# Patient Record
Sex: FEMALE | Race: WHITE | NOT HISPANIC OR LATINO | Employment: OTHER | URBAN - METROPOLITAN AREA
[De-identification: names, ages, dates, MRNs, and addresses within clinical notes are randomized per-mention and may not be internally consistent; named-entity substitution may affect disease eponyms.]

---

## 2017-01-04 ENCOUNTER — OFFICE VISIT (OUTPATIENT)
Dept: NEUROLOGY | Facility: CLINIC | Age: 67
End: 2017-01-04
Payer: MEDICARE

## 2017-01-04 VITALS
SYSTOLIC BLOOD PRESSURE: 104 MMHG | HEIGHT: 62 IN | WEIGHT: 140 LBS | BODY MASS INDEX: 25.76 KG/M2 | HEART RATE: 87 BPM | DIASTOLIC BLOOD PRESSURE: 69 MMHG

## 2017-01-04 DIAGNOSIS — R13.10 DYSPHAGIA, UNSPECIFIED TYPE: Primary | ICD-10-CM

## 2017-01-04 DIAGNOSIS — R11.0 NAUSEA: ICD-10-CM

## 2017-01-04 DIAGNOSIS — R14.0 GENERALIZED BLOATING: ICD-10-CM

## 2017-01-04 DIAGNOSIS — G20.A1 PD (PARKINSON'S DISEASE): ICD-10-CM

## 2017-01-04 DIAGNOSIS — G20.A1 DEMENTIA DUE TO PARKINSON'S DISEASE WITHOUT BEHAVIORAL DISTURBANCE: ICD-10-CM

## 2017-01-04 DIAGNOSIS — F02.80 DEMENTIA DUE TO PARKINSON'S DISEASE WITHOUT BEHAVIORAL DISTURBANCE: ICD-10-CM

## 2017-01-04 PROCEDURE — 99215 OFFICE O/P EST HI 40 MIN: CPT | Mod: S$PBB,,, | Performed by: PSYCHIATRY & NEUROLOGY

## 2017-01-04 PROCEDURE — 99999 PR PBB SHADOW E&M-EST. PATIENT-LVL III: CPT | Mod: PBBFAC,,, | Performed by: PSYCHIATRY & NEUROLOGY

## 2017-01-04 PROCEDURE — 99213 OFFICE O/P EST LOW 20 MIN: CPT | Mod: PBBFAC,PN | Performed by: PSYCHIATRY & NEUROLOGY

## 2017-01-04 NOTE — MR AVS SNAPSHOT
[facility]  [address]  [place] [address]  Phone: [phone]  Fax: [phone]                  Dilma Conti   2017 10:00 AM   Office Visit    Description:  Female : 1950   Provider:  Teena Helton MD   Department:  [facility]           Reason for Visit     Parkinson's Disease           Diagnoses this Visit        Comments    Dysphagia, unspecified type    -  Primary     PD (Parkinson's disease)         Nausea         Generalized bloating         Dementia due to Parkinson's disease without behavioral disturbance                To Do List           Goals (5 Years of Data)     None      Follow-Up and Disposition     Return in about 4 weeks (around 2017) for PD.    Follow-up and Disposition History      [facility]     [facility] On Call Nurse Care Line -  Assistance  Registered nurses in the [facility] provide clinical advisement, health education, appointment booking, and other advisory services.  Call for this free service at [phone].             Medications           Message regarding Medications     Verify the changes and/or additions to your medication regime listed below are the same as discussed with your clinician today.  If any of these changes or additions are incorrect, please notify your healthcare provider.        STOP taking these medications     linaclotide (LINZESS) 145 mcg Cap capsule Take 290 mcg by mouth Daily.           Verify that the below list of medications is an accurate representation of the medications you are currently taking.  If none reported, the list may be blank. If incorrect, please contact your healthcare provider. Carry this list with you in case of emergency.           Current Medications     alprazolam (XANAX) 0.5 MG tablet TAKE 1 TABLET BY MOUTH 3 TIMES A DAY AS NEEDED FOR ANXIETY    bisacodyl (DULCOLAX) 5 mg EC tablet Take 5 mg by mouth daily as needed for Constipation.     "carbidopa-levodopa  mg (SINEMET)  mg per tablet Take 1.5 tablets by mouth 4 (four) times daily.    estradiol (ESTRACE) 2 MG tablet Take 2 mg by mouth once daily.    famotidine (PEPCID) 20 MG tablet TAKE 1 TABLET BY MOUTH EVERY DAY FOR STOMACH    hydrochlorothiazide (MICROZIDE) 12.5 mg capsule Take 1 capsule (12.5 mg total) by mouth once daily.    levothyroxine (SYNTHROID) 50 MCG tablet Take 1 tablet (50 mcg total) by mouth once daily.    midodrine (PROAMATINE) 10 MG tablet Take one in morning and noon.    mirtazapine (REMERON) 15 MG tablet Take 1 tablet (15 mg total) by mouth every evening.    ondansetron (ZOFRAN-ODT) 4 MG TbDL Take 1 tablet (4 mg total) by mouth every 8 (eight) hours as needed (nausea).    polyethylene glycol (GLYCOLAX) 17 gram/dose powder Take 17 g by mouth once daily.    tramadol (ULTRAM) 50 mg tablet Take 1 tablet (50 mg total) by mouth every 6 (six) hours as needed for Pain.    trazodone (DESYREL) 100 MG tablet TAKE 2 TABLETS BY MOUTH EVERY EVENING.    venlafaxine (EFFEXOR-XR) 150 MG Cp24 TAKE ONE CAPSULE BY MOUTH EVERY DAY           Clinical Reference Information           Vital Signs - Last Recorded  Most recent update: 1/4/2017  9:53 AM by María Sanchez MA    BP Pulse Ht Wt BMI    104/69 (BP Location: Left arm, Patient Position: Sitting, BP Method: Automatic) 87 5' 1.5" (1.562 m) 63.5 kg (140 lb) 26.02 kg/m2      Blood Pressure          Most Recent Value    BP  104/69      Allergies as of 1/4/2017     Mirapex [Pramipexole]    Bactrim [Sulfamethoxazole-trimethoprim]    Dilaudid [Hydromorphone]    Gabapentin    Lipitor [Atorvastatin]    Statins-hmg-coa Reductase Inhibitors    Zocor [Simvastatin]    Demerol [Meperidine]    Flu Vaccine 2011 (36 Mos+)(pf)      Immunizations Administered on Date of Encounter - 1/4/2017     None      Orders Placed During Today's Visit      Normal Orders This Visit    Ambulatory referral to Speech Therapy     Future Labs/Procedures Expected by " Expires    Fl Modified Barium Swallow Speech  1/4/2017 1/4/2018    NM Gastric Emptying  1/4/2017 1/4/2018      [facility] Sign-Up     Activating your [facility] account is as easy as 1-2-3!     1) Visit [email or web address], select Sign Up Now, enter this activation code and your date of birth, then select Next.  Activation code not generated  Current Patient Portal Status: Account disabled      2) Create a username and password to use when you visit [facility] in the future and select a security question in case you lose your password and select Next.    3) Enter your e-mail address and click Sign Up!    Additional Information  If you have questions, please e-mail [email or web address] or call [phone] to talk to our [facility] staff. Remember, [facility] is NOT to be used for urgent needs. For medical emergencies, dial 911.

## 2017-05-02 ENCOUNTER — PATIENT MESSAGE (OUTPATIENT)
Dept: NEUROLOGY | Facility: CLINIC | Age: 67
End: 2017-05-02

## 2017-05-04 ENCOUNTER — PATIENT MESSAGE (OUTPATIENT)
Dept: NEUROLOGY | Facility: CLINIC | Age: 67
End: 2017-05-04

## 2017-05-04 DIAGNOSIS — R11.0 NAUSEA: Primary | ICD-10-CM

## 2017-05-05 PROBLEM — M85.851 OSTEOPENIA OF BOTH THIGHS: Status: ACTIVE | Noted: 2017-05-05

## 2017-05-05 PROBLEM — M85.852 OSTEOPENIA OF BOTH THIGHS: Status: ACTIVE | Noted: 2017-05-05

## 2017-05-17 ENCOUNTER — PATIENT MESSAGE (OUTPATIENT)
Dept: NEUROLOGY | Facility: CLINIC | Age: 67
End: 2017-05-17

## 2017-05-31 ENCOUNTER — TELEPHONE (OUTPATIENT)
Dept: NEUROLOGY | Facility: CLINIC | Age: 67
End: 2017-05-31

## 2017-05-31 NOTE — TELEPHONE ENCOUNTER
----- Message from Delmi Busch sent at 5/30/2017  4:29 PM CDT -----  Contact: Patient  Dilma, patient [phone], Calling for a follow up appointment, to review medication and possible additional testing. Please advise. Thanks.   Scheduled booked.

## 2017-06-20 PROBLEM — M54.12 CERVICAL RADICULOPATHY: Status: ACTIVE | Noted: 2017-06-20

## 2017-06-26 RX ORDER — TRAZODONE HYDROCHLORIDE 100 MG/1
TABLET ORAL
Qty: 60 TABLET | Refills: 10 | Status: SHIPPED | OUTPATIENT
Start: 2017-06-26 | End: 2018-05-29 | Stop reason: SDUPTHER

## 2017-07-05 ENCOUNTER — LAB VISIT (OUTPATIENT)
Dept: LAB | Facility: HOSPITAL | Age: 67
End: 2017-07-05
Attending: PSYCHIATRY & NEUROLOGY
Payer: MEDICARE

## 2017-07-05 ENCOUNTER — OFFICE VISIT (OUTPATIENT)
Dept: NEUROLOGY | Facility: CLINIC | Age: 67
End: 2017-07-05
Payer: MEDICARE

## 2017-07-05 VITALS
WEIGHT: 139.88 LBS | HEIGHT: 62 IN | DIASTOLIC BLOOD PRESSURE: 78 MMHG | HEART RATE: 84 BPM | BODY MASS INDEX: 25.74 KG/M2 | SYSTOLIC BLOOD PRESSURE: 124 MMHG

## 2017-07-05 DIAGNOSIS — R11.0 NAUSEA: ICD-10-CM

## 2017-07-05 DIAGNOSIS — E03.9 ACQUIRED HYPOTHYROIDISM: ICD-10-CM

## 2017-07-05 DIAGNOSIS — G90.3 NEUROLOGIC ORTHOSTATIC HYPOTENSION: ICD-10-CM

## 2017-07-05 DIAGNOSIS — F02.80 DEMENTIA DUE TO PARKINSON'S DISEASE WITHOUT BEHAVIORAL DISTURBANCE: ICD-10-CM

## 2017-07-05 DIAGNOSIS — G20.A1 DEMENTIA DUE TO PARKINSON'S DISEASE WITHOUT BEHAVIORAL DISTURBANCE: ICD-10-CM

## 2017-07-05 DIAGNOSIS — F03.93 DEPRESSION DUE TO DEMENTIA: ICD-10-CM

## 2017-07-05 DIAGNOSIS — M79.605 LEG PAIN, BILATERAL: ICD-10-CM

## 2017-07-05 DIAGNOSIS — M79.604 LEG PAIN, BILATERAL: ICD-10-CM

## 2017-07-05 DIAGNOSIS — G20.A1 PD (PARKINSON'S DISEASE): Primary | ICD-10-CM

## 2017-07-05 DIAGNOSIS — R44.3 HALLUCINATIONS: ICD-10-CM

## 2017-07-05 LAB
ALBUMIN SERPL BCP-MCNC: 3.8 G/DL
ALP SERPL-CCNC: 87 U/L
ALT SERPL W/O P-5'-P-CCNC: <5 U/L
AST SERPL-CCNC: 21 U/L
BILIRUB DIRECT SERPL-MCNC: 0.1 MG/DL
BILIRUB SERPL-MCNC: 0.3 MG/DL
CK SERPL-CCNC: 57 U/L
PROT SERPL-MCNC: 7.7 G/DL
RHEUMATOID FACT SERPL-ACNC: <10 IU/ML
TSH SERPL DL<=0.005 MIU/L-ACNC: 1.43 UIU/ML
URATE SERPL-MCNC: 3 MG/DL

## 2017-07-05 PROCEDURE — 80076 HEPATIC FUNCTION PANEL: CPT

## 2017-07-05 PROCEDURE — 86431 RHEUMATOID FACTOR QUANT: CPT

## 2017-07-05 PROCEDURE — 99999 PR PBB SHADOW E&M-EST. PATIENT-LVL III: CPT | Mod: PBBFAC,,, | Performed by: PSYCHIATRY & NEUROLOGY

## 2017-07-05 PROCEDURE — 1159F MED LIST DOCD IN RCRD: CPT | Mod: ,,, | Performed by: PSYCHIATRY & NEUROLOGY

## 2017-07-05 PROCEDURE — 84443 ASSAY THYROID STIM HORMONE: CPT

## 2017-07-05 PROCEDURE — 1125F AMNT PAIN NOTED PAIN PRSNT: CPT | Mod: ,,, | Performed by: PSYCHIATRY & NEUROLOGY

## 2017-07-05 PROCEDURE — 99214 OFFICE O/P EST MOD 30 MIN: CPT | Mod: S$PBB,,, | Performed by: PSYCHIATRY & NEUROLOGY

## 2017-07-05 PROCEDURE — 36415 COLL VENOUS BLD VENIPUNCTURE: CPT | Mod: PO

## 2017-07-05 PROCEDURE — 82550 ASSAY OF CK (CPK): CPT

## 2017-07-05 PROCEDURE — 84550 ASSAY OF BLOOD/URIC ACID: CPT

## 2017-07-05 RX ORDER — GABAPENTIN 100 MG/1
100 CAPSULE ORAL NIGHTLY
Qty: 30 CAPSULE | Refills: 11 | Status: SHIPPED | OUTPATIENT
Start: 2017-07-05 | End: 2017-10-09

## 2017-07-05 RX ORDER — TRAMADOL HYDROCHLORIDE 50 MG/1
50 TABLET ORAL DAILY PRN
COMMUNITY
End: 2017-10-09

## 2017-07-05 RX ORDER — ONDANSETRON 4 MG/1
4 TABLET, FILM COATED ORAL EVERY 8 HOURS PRN
COMMUNITY
End: 2017-10-09

## 2017-07-05 NOTE — PROGRESS NOTES
"     I. Chief Complaints during this visit:  f/u Patient visit for PD  ?    History of present illness:   67 y.o. W returns in f/u for parkinson's disease.  Accompanied by two daughters-in-law, Loly and Orville.  Her main complaint is her feeling of sickness and nausea every afternoon.    Pain in legs continues to be bad.  Says her muscles are tender.  Heels pain and big toe numb.  Memory continues to be poor.  Continues to have headaches.    Getting around without a walker.  This is one symptom DNLs think is pretty good.  Fell backwards in a store.    Still has some visions ( dog) intermittently.  Also hears people talking.    Ex-  in April.  Endorses depression.    Interval history 17:  Continues to feel "sick" all the time with nausea.  Continues to have significant memory problems.    No recent falls.  Seen by Isaebl diaz 2     Interval history 16:  Now on rytary which also gives her nausea.  Can walk with walker.  Only one fall since last seen.  Pain is better, though still can't stay sitting or standing long or her legs hurt her.  Memory poor.  Emotionally labile.    From my note 16:  Accompanied by Loly (DNL that helps most), son and another DNL.  All say she has worsened in mobility in past couple months.    Since stopping the requip and sinemet on Monday:  - her abdominal edema is better  - her abdominal pain is worse  - her lower extremity stiffness and pain are worse      From my note 3/31/16:  ...accompanied by ex-.  I resumed trazodone and requip soon after last visit because she seemed to worsen in gait ability.  It is unclear if this made any difference as she remains "tore up."  Can't walk well without help.  Can't walk backwards at all.  Continue to says that she is worse since colon surgery last fall.  Feels a "pinch" in her abdomen when breathing, but also a persistent pain in abdomen. The persistant abdominal pain is the same as last year prior to colon " "surgery.  Still significant nausea.  Recent u/s of pelvis and ultrasounds were negative.  Extremely short-term memory loss.  Continues to have the headaches, but only upon awakening.    From my note 2/12/16:  Feels like "something is not right."  Complains of pounding headaches for about 2-3 weeks.  Occur in afternoons every day.  Sits down or lays down.  No scotomas.  Dyskinesias have recurred as well.  Significant vivid dreaming.  Poor, broken sleep.  Also "seems like some body there" outside window.  Will suddenly find herself talking out loud.  Unable to get out of bed in the morning without help.  But then can move around.  Still has the significant depression.  Crying spells.  Short term memory loss.  Hypersensitive to touch, "bones just ache."  Also complains of swelling in her abdomen and legs.  She is concerned about this.    From my note 11/6/15:  Since last seen, she had colon surgery for tortuous colon and adhesions.  She became "mean" with oxycodone, but otherwise, hospitalization was "ok."  Since then, now having regular bowel movements.  Her main concern is her memory.  Ex- endorses.    Left leg has bolts of lightening and not moving if she tries to walk.  No effect from levodopa on this.  Saw dr. Estrella yesterday and getting MRI.  Mood fluctuates, but not as poor as was earlier this summer.  Nausea is still chronic, but appetite much better.  Stomach pain better, but not resolved (takes a tramadol when bad).  No hallucinating in a while.  Mild, intermittent dizziness.    From my note 7/10/15:  At last visit, I reduced the levodopa and added requip in hopes her stomach pain would improve.  Her gait has worsened, but her stomach pain and nausea have not resolved.  She is not sure if she hallucinated, or not the other day.    From my note 6/19/15:  Nausea has picked up, again to point of dehydration.  Still constipated, no BM in a week.  Swaying more, but also having more tremors, very " "irritable.    From my note 3/20/15:  Nausea better since starting prilosec.  Less depressed.  Improved dizzy/ presyncopal spells as well.  Unsteady on feet, must use walker.  Many close-calls.  Cannot get out of a chair, so she bought a lift-chair.  Not sleeping well at night (some nights).    From my phone message 11/11/14:  No phone number taken for  nurse, so I called patient.  No longer on requip (not sure when that was stopped, but reasonable).  Double midodrine to 5mg morning and noon (was only taking 2.5mg) for a week, then up to 10mg bid.  If not doing better with BP and symptoms within the next two weeks, I may have to lower her sinemet (and work her in for an appointment).    From my note 12/30/13:  ..she seems to have spiraled down into depression, again, per daughter.  She says she is achey "all over" and cannot get comfortable at night.  She dozes during the day, but daughter tells me outside of room that she "just won't get out of bed; as if she has given up."  She also mentions that she has epigastric pain every morning.  She has spoken to PCP who recommended GI study, but she wanted to wait longer.  She has new headaches and a "spot" in left eye.  She had migraines in remote past.      II. Review of Systems -as in HPI   III.   Past Medical History    Diagnosis  Date      Depression       Lumbar spondylosis       Parkinsonism  2012      left leg apraxia and fine tremors      Family History    Problem  Relation  Age of Onset      Alzheimer's disease  Mother       Parkinsonism  Mother       Stroke  Father       Hyperlipidemia  Mother       Hypertension  Father       History      Social History      Marital Status:        Spouse Name:  N/A      Number of Children:  N/A      Years of Education:  N/A      Occupational History      Retired       Used to own convenience store      Social History Main Topics      Smoking status:  Current Everyday Smoker -- 1.0 packs/day for 12 years      " "Types:  Cigarettes      Smokeless tobacco:  None      Alcohol Use:  No      Drug Use:  No      Sexually Active:  Yes -- Male partner(s)      Other Topics  Concern      None      Social History Narrative      None    ?      Current Neuro/Psych medication dosing and times:   Midodrine 10mg morning/ noon  Sinemet 25/100 1.5 qid    effexor XR 150mg qd, 75mg qhs   trazodone 200 qhs?  Tramadol for pain  zofran 4 for nausea  hctz 12.5 qd (Not sure she is taking)  ????  Prior disease-specific medications tried and effect/outcomes: mirapex cause headaches, edema and hallucinations; florinef causes nausea; stalevo caused hallucinations; amantadine caused psychosis; rivastigmine; gabapentin (can't recall effects)       Review of patient's allergies indicates:   Allergen Reactions    Mirapex [pramipexole]      Edema, psychosis    Bactrim [sulfamethoxazole-trimethoprim] Nausea And Vomiting    Dilaudid [hydromorphone]     Gabapentin      Pt don't remember    Lipitor [atorvastatin]     Statins-hmg-coa reductase inhibitors      Patient feels like she is having a heart attack.    Zocor [simvastatin]     Demerol [meperidine]      Pt don't remember    Flu vaccine 2011 (36 mos+)(pf)      Actually was before 2011. She cannot recall reaction, but required hospitalization       IV.  Physical Exam (Includes Part III of Unified Parkinsons Disease Rating Scale 2008)  Patient taking PD meds? Yes.    If yes, what medication and hour/minutes since last dose: ?3 days, sinemet?    Vitals:    07/05/17 1035   BP: 124/78   BP Location: Left arm   Patient Position: Sitting   BP Method: Automatic   Pulse: 84   Weight: 63.5 kg (139 lb 14.1 oz)   Height: 5' 1.5" (1.562 m)     General appearance: Well nourished, well developed, no acute distress    Awake, alert and oriented x 3  Mild malaise  Mild dyskinesias, trunk  Left leg apraxia, severe          V.  Summarized pertinant Laboratory/ Radiological Data: ??no new    From my note " "11/6/15:  [place] Cognitive Assessment:   13/30    Lab Results   Component Value Date    [id number] 621 08/31/2016       [facility]   Component Date Value Ref Range Status    Antigliadin Abs, IgA 01/22/2015 6  <20 Units Final    Antigliadin Ab IgG 01/22/2015 3  <20 Units Final    TTG IgA 01/22/2015 7  <20 UNITS Final    TTG IgG 01/22/2015 3  <20 UNITS Final    Arsenic 01/22/2015 Not Detected  0 - 12 ng/mL Final    Lead 01/22/2015 1  0 - 9 mcg/dL Final    Cadmium 01/22/2015 0.3  0.0 - 4.9 ng/mL Final    Mercury 01/22/2015 <1  0 - 9 ng/mL Final    Venous/Capillary 01/22/2015 BILL   Final       From my note 5/27/13:  Jc Scan + for reduced dopamine right bg    Neuropsych testing 2013:  Parkinson's Dementia, mild to moderate Depressive disorder        VI. Medical Decision Making    Problem List Items Addressed This Visit        1 - High    PD (Parkinson's disease) - Primary    Overview     Left leg apraxia, tremor.  Levodopa-responsive         Current Assessment & Plan     Currently, she is doing well in this regard.  Lower dopamine equivalents (in past) caused worsening gait and mobility.  She is ambulating without a walker, which is a great improvement from 2 years ago.    Her parkinsonism is still obviously an atypical type, but more specific diagnosis continues to be elusive.     -> no change in sinemet         Leg pain, bilateral    Overview     "ache" and high sensitivity to touch in all limbs, but worse in legs.           Current Assessment & Plan     This ache is not levodopa-responsive.  Continues, though today I understood the sensitivity to touch better than I have appreciated in past.   -> labs for RF, uric acid, ck   -> trial of gabapentin (she says she tried in past but it made her "nauseous")         Relevant Medications    gabapentin (NEURONTIN) 100 MG capsule    Other Relevant Orders    Uric acid    Hepatic function panel    CK    RHEUMATOID FACTOR       2     Dementia due " to Parkinson's disease without behavioral disturbance    Overview     11/2015 [place] Cognitive Assessment:  13/30 2013 neuropsych:  Mild-moderate PD dementia         Current Assessment & Plan     Casually, she does not seem to be worse than 2 years ago and functionally, much better than a 13/30 would suggest.   -> unable to tolerate memory meds            3     Neurologic orthostatic hypotension    Current Assessment & Plan     stable            4     Depression due to dementia    Current Assessment & Plan     Describes being worse in recent time due to 's death.   -> recent increase in effexor            5     Nausea    Overview     Chronic, persistent, starts around 1-2pm daily.         Current Assessment & Plan     Continued, persistent nausea (though today family made it sound like symptoms only start in afternoons).  She has been through exhaustive GI evaluations with no culprit found.  Her nausea started before levodopa, but we also tried to reduce this a couple years ago without success (gait worsened, nausea remained the same).   -> I suggested avoiding lactose for a week, but she said this was impossible, so we settled on next best test of taking lactaid with every meal.              6     Hallucinations    Overview     Sees her dead dog, hears people.  Markedly worse when on requip or mirapex.         Current Assessment & Plan     Stable, benign hallucinations.           Other Visit Diagnoses    None.           Return in about 3 months (around 10/5/2017) for PD.

## 2017-07-05 NOTE — ASSESSMENT & PLAN NOTE
Casually, she does not seem to be worse than 2 years ago and functionally, much better than a 13/30 would suggest.   -> unable to tolerate memory meds

## 2017-07-05 NOTE — ASSESSMENT & PLAN NOTE
"This ache is not levodopa-responsive.  Continues, though today I understood the sensitivity to touch better than I have appreciated in past.   -> labs for RF, uric acid, ck   -> trial of gabapentin (she says she tried in past but it made her "nauseous")  "

## 2017-07-05 NOTE — ASSESSMENT & PLAN NOTE
Currently, she is doing well in this regard.  Lower dopamine equivalents (in past) caused worsening gait and mobility.  She is ambulating without a walker, which is a great improvement from 2 years ago.    Her parkinsonism is still obviously an atypical type, but more specific diagnosis continues to be elusive.     -> no change in sinemet

## 2017-07-05 NOTE — PATIENT INSTRUCTIONS
Lactaid:   some at the store and take with every meal (including ensure) for 2-3 days.  This is to test if LACTULOSE is actually causing some of your nausea.      Gabapentin:  Take one at night.  This is to reduce the leg pains.

## 2017-07-05 NOTE — ASSESSMENT & PLAN NOTE
Continued, persistent nausea (though today family made it sound like symptoms only start in afternoons).  She has been through exhaustive GI evaluations with no culprit found.  Her nausea started before levodopa, but we also tried to reduce this a couple years ago without success (gait worsened, nausea remained the same).   -> I suggested avoiding lactose for a week, but she said this was impossible, so we settled on next best test of taking lactaid with every meal.

## 2017-07-10 DIAGNOSIS — F32.A DEPRESSION: ICD-10-CM

## 2017-07-10 RX ORDER — VENLAFAXINE HYDROCHLORIDE 150 MG/1
CAPSULE, EXTENDED RELEASE ORAL
Qty: 90 CAPSULE | Refills: 3 | Status: SHIPPED | OUTPATIENT
Start: 2017-07-10 | End: 2018-07-02 | Stop reason: SDUPTHER

## 2017-07-11 DIAGNOSIS — G20.A1 PD (PARKINSON'S DISEASE): ICD-10-CM

## 2017-07-11 RX ORDER — CARBIDOPA AND LEVODOPA 25; 100 MG/1; MG/1
1.5 TABLET ORAL 4 TIMES DAILY
Qty: 270 TABLET | Refills: 7 | Status: SHIPPED | OUTPATIENT
Start: 2017-07-11 | End: 2018-07-19 | Stop reason: SDUPTHER

## 2017-08-21 ENCOUNTER — PATIENT MESSAGE (OUTPATIENT)
Dept: NEUROLOGY | Facility: CLINIC | Age: 67
End: 2017-08-21

## 2017-08-24 ENCOUNTER — PATIENT MESSAGE (OUTPATIENT)
Dept: NEUROLOGY | Facility: CLINIC | Age: 67
End: 2017-08-24

## 2017-08-25 ENCOUNTER — PATIENT MESSAGE (OUTPATIENT)
Dept: NEUROLOGY | Facility: CLINIC | Age: 67
End: 2017-08-25

## 2017-10-09 ENCOUNTER — OFFICE VISIT (OUTPATIENT)
Dept: NEUROLOGY | Facility: CLINIC | Age: 67
End: 2017-10-09
Payer: MEDICARE

## 2017-10-09 VITALS
HEIGHT: 62 IN | HEART RATE: 88 BPM | SYSTOLIC BLOOD PRESSURE: 104 MMHG | WEIGHT: 140.88 LBS | RESPIRATION RATE: 18 BRPM | BODY MASS INDEX: 25.92 KG/M2 | DIASTOLIC BLOOD PRESSURE: 67 MMHG

## 2017-10-09 DIAGNOSIS — R11.0 NAUSEA: ICD-10-CM

## 2017-10-09 DIAGNOSIS — G20.A1 PD (PARKINSON'S DISEASE): Primary | ICD-10-CM

## 2017-10-09 DIAGNOSIS — G20.A1 DEMENTIA DUE TO PARKINSON'S DISEASE WITHOUT BEHAVIORAL DISTURBANCE: ICD-10-CM

## 2017-10-09 DIAGNOSIS — F02.80 DEMENTIA DUE TO PARKINSON'S DISEASE WITHOUT BEHAVIORAL DISTURBANCE: ICD-10-CM

## 2017-10-09 DIAGNOSIS — R44.3 HALLUCINATIONS: ICD-10-CM

## 2017-10-09 DIAGNOSIS — R60.9 EDEMA, UNSPECIFIED TYPE: ICD-10-CM

## 2017-10-09 PROCEDURE — 99214 OFFICE O/P EST MOD 30 MIN: CPT | Mod: S$PBB,,, | Performed by: NURSE PRACTITIONER

## 2017-10-09 PROCEDURE — 99999 PR PBB SHADOW E&M-EST. PATIENT-LVL III: CPT | Mod: PBBFAC,,, | Performed by: NURSE PRACTITIONER

## 2017-10-09 PROCEDURE — 99213 OFFICE O/P EST LOW 20 MIN: CPT | Mod: PBBFAC,PN | Performed by: NURSE PRACTITIONER

## 2017-10-09 NOTE — PATIENT INSTRUCTIONS
Try taking carbidopa / levodopa 25/100- 1 tablet 4 times daily. Okay to use an extra half tablet if needed for tremor, stiffness, and slowness.     Let's see if this helps nausea and hallucinations.    Consider seeing GI doctor if nausea persists.

## 2017-10-09 NOTE — PROGRESS NOTES
Name: Dilma Conti  MRN: [id number]   [facility]: [id number]      Date: 10-9-17      Referring physician:  No referring provider defined for this encounter.    Subjective:      Chief Complaint: PD    History of Present Illness (HPI):    Dilma Conti is a 67 y.o.  female who presents today for a follow-up evaluation of Parkinson's Disease and is accompanied by patient and two daughters-in-law.     Last seen by Dr. Helton 7-5-17. She rec having her take Lactaid to see if this helped nausea. It did not. Email correspondence with Dr. Helton about this.   She stopped the gabapentin as caused hallucinations.     Feet and legs swell and hurt. Numb. Can't stand anyone to touch her legs. This has been this way since diagnosed with PD.   Stays nauseated. Has not seen GI recently. She describes nausea as soon as she awakes. Her last dose of cd/ld at 8:30 PM. Gets up around 7AM.   Hard time communicating.  In shower, she will forget what she is in there for and more confused lately. Says she cannot remember anything.  Her granddaughter lives with her.   She does not drive about 5-6 years.       Review of Systems   Cardiovascular: Positive for leg swelling.   Gastrointestinal: Positive for nausea.   Musculoskeletal:        Pain to touch legs   Neurological: Positive for tremors.   Psychiatric/Behavioral: Positive for hallucinations.               Past Medical History: The patient  has a past medical history of Anxiety; Back pain; Back pain; Dementia in conditions classified elsewhere without behavioral disturbance; Dementia with Lewy bodies; Depression; GERD (gastroesophageal reflux disease); Hyperlipidemia; Lumbar herniated disc; Lumbar radiculopathy; Lumbar radiculopathy; Lumbar spondylosis; Lumbosacral disc disease; Memory loss; Memory loss; PD (Parkinson's disease) (2012); Renal insufficiency; Sciatica of left side; and Tremor.    Social History: The patient  reports that she quit smoking about 3 years ago. Her smoking use  included Cigarettes. She has a 12.00 pack-year smoking history. She has never used smokeless tobacco. She reports that she does not drink alcohol or use drugs.    Family History: Their family history includes Alzheimer's disease in her mother; Hyperlipidemia in her mother; Hypertension in her father; Parkinsonism in her mother; Stroke in her father.    Allergies: Mirapex [pramipexole]; Bactrim [sulfamethoxazole-trimethoprim]; Dilaudid [hydromorphone]; Gabapentin; Lipitor [atorvastatin]; Statins-hmg-coa reductase inhibitors; Zocor [simvastatin]; Demerol [meperidine]; and Flu vaccine 2011 (36 mos+)(pf)     Meds:   Current Outpatient Prescriptions on File Prior to Visit   Medication Sig Dispense Refill    alprazolam (XANAX) 0.5 MG tablet TAKE 1 TABLET BY MOUTH 3 TIMES A DAY AS NEEDED FOR ANXIETY 90 tablet 5    carbidopa-levodopa  mg (SINEMET)  mg per tablet TAKE 1.5 TABLETS BY MOUTH 4 (FOUR) TIMES DAILY. 270 tablet 7    famotidine (PEPCID) 20 MG tablet TAKE 1 TABLET BY MOUTH EVERY DAY FOR STOMACH 30 tablet 11    levothyroxine (SYNTHROID) 50 MCG tablet TAKE 1 TABLET (50 MCG TOTAL) BY MOUTH ONCE DAILY. 30 tablet 11    trazodone (DESYREL) 100 MG tablet TAKE 2 TABLETS BY MOUTH EVERY EVENING. 60 tablet 10    venlafaxine (EFFEXOR-XR) 150 MG Cp24 TAKE ONE CAPSULE BY MOUTH EVERY DAY 90 capsule 3    venlafaxine (EFFEXOR-XR) 75 MG 24 hr capsule Take 1 capsule (75 mg total) by mouth once daily. 30 capsule 11    hydrochlorothiazide (MICROZIDE) 12.5 mg capsule Take 1 capsule (12.5 mg total) by mouth once daily. 30 capsule 11    [DISCONTINUED] gabapentin (NEURONTIN) 100 MG capsule Take 1 capsule (100 mg total) by mouth every evening. 30 capsule 11    [DISCONTINUED] nystatin (MYCOSTATIN) ointment Apply topically 2 (two) times daily. To irritated areas 30 g 1    [DISCONTINUED] ondansetron (ZOFRAN) 4 MG tablet Take 4 mg by mouth every 8 (eight) hours as needed for Nausea.      [DISCONTINUED] tramadol (ULTRAM) 50  "mg tablet Take 50 mg by mouth daily as needed for Pain.       No current facility-administered medications on file prior to visit.        Objective:     Physical Exam:    Vitals:    10/09/17 0943   BP: 104/67   BP Location: Right arm   Patient Position: Sitting   BP Method: Medium (Automatic)   Pulse: 88   Resp: 18   Weight: 63.9 kg (140 lb 14 oz)   Height: 5' 1.5" (1.562 m)     Body mass index is 26.19 kg/m².    Constitutional  Well-developed, well-nourished, appears stated age   Cardiovascular  Radial pulses 2+ and symmetric, subtle BLE edema, no pitting or weeping, mainly noted at ankles.     ..  * Specialized movement exam  Last dose 3 hours ago Mild hypophonic speech.    Mild facial masking.   Mild cogwheel rigidity.  Guards eval of BLE due to pain on touching skin.    Mild bradykinesia RH and near moderate LH. Moderate R foot and near severe L foot.    No tremor with rest or posture.   No other dystonia, chorea, athetosis, myoclonus, or tics.     Pushes self to stand.   No motor impersistence.  Bit broad based gait.    Shortened stride length.   Reduced arm swing.     Pull test deferred.           Laboratory Results:  No visits with results within 3 Month(s) from this visit.   Latest known visit with results is:   Lab Visit on 07/05/2017   Component Date Value Ref Range Status    TSH 07/05/2017 1.425  0.400 - 4.000 uIU/mL Final    Uric Acid 07/05/2017 3.0  2.4 - 5.7 mg/dL Final    Total Protein 07/05/2017 7.7  6.0 - 8.4 g/dL Final    Albumin 07/05/2017 3.8  3.5 - 5.2 g/dL Final    Total Bilirubin 07/05/2017 0.3  0.1 - 1.0 mg/dL Final    Bilirubin, Direct 07/05/2017 0.1  0.1 - 0.3 mg/dL Final    AST 07/05/2017 21  10 - 40 U/L Final    ALT 07/05/2017 <5* 10 - 44 U/L Final    Alkaline Phosphatase 07/05/2017 87  55 - 135 U/L Final    CPK 07/05/2017 57  20 - 180 U/L Final    Rheumatoid Factor 07/05/2017 <10.0  0.0 - 15.0 IU/mL Final         Assessment and Plan     PD (Parkinson's disease)    Dementia due " to Parkinson'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 Okay to use an extra half tablet if needed for tremor, stiffness, and slowness. See if this helps reduce nausea and hallucinations.  Could try Sinemet CR qhs (as describes some discomfort and shaking at night at times) and then try during day if tolerates. May help nausea?    Consider seeing GI doctor if nausea persists.     They ask about the leg pain to touch. I do not think this is PD related. She asks if this could be fibromyalgia. I suspect this is possible but certainly not within my realm of comfort or expertise.     Follow up with Dr. Helton 3-4 mos in [place].         I spent 30 minutes face-to-face with the patient and family with >50% of the time spent with counseling and education regarding:  - results of data, diagnosis, and recommendations stated above  - the prognosis of PD  - risks and benefits of cd/ld  - importance of diet and exercise    Isabel Auguste, DELROY, NP-C  Division of Movement and Memory Disorders  [facility]  [phone]

## 2017-11-16 PROBLEM — M79.10 MUSCLE PAIN: Status: ACTIVE | Noted: 2017-11-16

## 2017-12-20 ENCOUNTER — PATIENT MESSAGE (OUTPATIENT)
Dept: NEUROLOGY | Facility: CLINIC | Age: 67
End: 2017-12-20

## 2017-12-21 ENCOUNTER — PATIENT MESSAGE (OUTPATIENT)
Dept: NEUROLOGY | Facility: CLINIC | Age: 67
End: 2017-12-21

## 2017-12-28 ENCOUNTER — INITIAL CONSULT (OUTPATIENT)
Dept: RHEUMATOLOGY | Facility: CLINIC | Age: 67
End: 2017-12-28
Payer: MEDICARE

## 2017-12-28 ENCOUNTER — HOSPITAL ENCOUNTER (OUTPATIENT)
Dept: RADIOLOGY | Facility: HOSPITAL | Age: 67
Discharge: HOME | End: 2017-12-28
Attending: INTERNAL MEDICINE
Payer: MEDICARE

## 2017-12-28 VITALS
HEART RATE: 76 BPM | BODY MASS INDEX: 26.46 KG/M2 | DIASTOLIC BLOOD PRESSURE: 82 MMHG | HEIGHT: 61 IN | WEIGHT: 140.13 LBS | SYSTOLIC BLOOD PRESSURE: 144 MMHG

## 2017-12-28 DIAGNOSIS — R74.8 ELEVATED CPK: ICD-10-CM

## 2017-12-28 DIAGNOSIS — M25.552 PAIN OF LEFT HIP JOINT: ICD-10-CM

## 2017-12-28 DIAGNOSIS — M79.10 MYALGIA: ICD-10-CM

## 2017-12-28 DIAGNOSIS — G20.A1 PD (PARKINSON'S DISEASE): ICD-10-CM

## 2017-12-28 DIAGNOSIS — M51.36 DDD (DEGENERATIVE DISC DISEASE), LUMBAR: ICD-10-CM

## 2017-12-28 DIAGNOSIS — M25.552 PAIN OF LEFT HIP JOINT: Primary | ICD-10-CM

## 2017-12-28 PROCEDURE — 99999 PR PBB SHADOW E&M-EST. PATIENT-LVL III: CPT | Mod: PBBFAC,,, | Performed by: INTERNAL MEDICINE

## 2017-12-28 PROCEDURE — 99205 OFFICE O/P NEW HI 60 MIN: CPT | Mod: S$PBB,,, | Performed by: INTERNAL MEDICINE

## 2017-12-28 PROCEDURE — 73502 X-RAY EXAM HIP UNI 2-3 VIEWS: CPT | Mod: 26,LT,, | Performed by: RADIOLOGY

## 2017-12-28 PROCEDURE — 99213 OFFICE O/P EST LOW 20 MIN: CPT | Mod: PBBFAC,25,PO | Performed by: INTERNAL MEDICINE

## 2017-12-28 PROCEDURE — 73502 X-RAY EXAM HIP UNI 2-3 VIEWS: CPT | Mod: TC,PO,LT

## 2017-12-28 RX ORDER — TRAMADOL HYDROCHLORIDE 50 MG/1
50 TABLET ORAL EVERY 6 HOURS PRN
Qty: 40 TABLET | Refills: 0 | Status: ON HOLD | OUTPATIENT
Start: 2017-12-28 | End: 2018-12-17

## 2017-12-28 ASSESSMENT — ROUTINE ASSESSMENT OF PATIENT INDEX DATA (RAPID3)
PSYCHOLOGICAL DISTRESS SCORE: 5.5
PATIENT GLOBAL ASSESSMENT SCORE: 10
MDHAQ FUNCTION SCORE: 1.8
PAIN SCORE: 10
TOTAL RAPID3 SCORE: 8.66

## 2017-12-28 NOTE — PROGRESS NOTES
Subjective:          Chief Complaint: Dilma Conti is a 67 y.o. female who had concerns including Disease Management.    HPI:    Patient is a 67-year-old female with a recent elevation in her CPKs 7 prior from July 2017 was normal.  Should a negative rheumatoid factor at that time.  Aldolase on November 16 was normal but the myoglobin was slightly elevated as well.  Her sedimentation rate was elevated 1.  Has been referred by Dr. Woodard to Dr. Reyes for muscle biopsy.    Have an MRI of her lumbar spine showed spondylosis at the L3-4 disc space is rather stable compared to prior.  She had degenerative facet changes L3-4, L4-5 and L5-S1.  No spinal stenosis noted. She is noting pain in her left leg sore to touch. Feels weakness. Left leg swelling. She is having twitching in the left quad region. Ache in the shin as well. She notes problems with left leg for very long time at least 1 year. The swelling is improved with Lasix. She is having some pain in buttocks but cannot pinpoint any back pain. She is having some upper extremity weakness.   Trialed on gabapentin which did exacerbate hallucinations. Tylenol #3 with some hallucinations.   Tylenol: haven't tried.           Record review appears patient was complaining of some leg aches and pains with tenderness in her muscles since at least July 2017.  She does have a history of Parkinson's disease.   She's describing significant pain that she can even tolerate people touching her legs on this seems to been present since she was diagnosed with Parkinson's.  Her November 16, 2017 visit with Dr. Woodard she was noting the left leg was painful ulcerating 8 out of 10 more nocturnal in the morning also on the anterior quads some left shin on his having tingling be staying sensations in the feet to be worsening over time.  Hoping she would tolerate an MRI of her thighs very well as this would require her lying flat for approximately 45 minutes to a little over an  hour.    Component      Latest Ref Rng & Units 11/16/2017 7/5/2017   CPK      55 - 170 U/L 197 (H) 57   Rheumatoid Factor      0.0 - 15.0 IU/mL  <10.0   Sed Rate      0 - 29 mm/Hr 31 (H)    Aldolase      1.5 - 8.1 U/L 4.4    Myoglobin      25 - 58 ng/mL 63 (H)      REVIEW OF SYSTEMS:    Review of Systems   Constitutional: Positive for malaise/fatigue. Negative for fever and weight loss.   HENT: Negative for sore throat.    Eyes: Negative for double vision, photophobia and redness.   Respiratory: Negative for cough, shortness of breath and wheezing.    Cardiovascular: Negative for chest pain, palpitations and orthopnea.   Gastrointestinal: Negative for abdominal pain, constipation and diarrhea.   Genitourinary: Negative for dysuria, hematuria and urgency.   Musculoskeletal: Positive for joint pain and myalgias. Negative for back pain.   Skin: Negative for rash.   Neurological: Positive for tingling. Negative for dizziness, focal weakness and headaches.   Endo/Heme/Allergies: Does not bruise/bleed easily.   Psychiatric/Behavioral: Positive for memory loss. Negative for depression, hallucinations and suicidal ideas.               Objective:            Past Medical History:   Diagnosis Date    Anxiety     Back pain     Back pain     Dementia in conditions classified elsewhere without behavioral disturbance     Dementia with Lewy bodies     Depression     GERD (gastroesophageal reflux disease)     Hyperlipidemia     Lumbar herniated disc     Lumbar radiculopathy     Lumbar radiculopathy     Lumbar spondylosis     Lumbosacral disc disease     Memory loss     Memory loss     PD (Parkinson's disease) 2012    Left leg apraxia, tremor.  Levodopa-responsive     Renal insufficiency     Sciatica of left side     Tremor      Family History   Problem Relation Age of Onset    Alzheimer's disease Mother     Parkinsonism Mother     Hyperlipidemia Mother     Stroke Father     Hypertension Father      Social  History   Substance Use Topics    Smoking status: Former Smoker     Packs/day: 1.00     Years: 12.00     Types: Cigarettes     Quit date: 1/1/2014    Smokeless tobacco: Never Used    Alcohol use No         Current Outpatient Prescriptions on File Prior to Visit   Medication Sig Dispense Refill    alprazolam (XANAX) 0.5 MG tablet TAKE 1 TABLET BY MOUTH 3 TIMES A DAY AS NEEDED FOR ANXIETY 90 tablet 5    carbidopa-levodopa  mg (SINEMET)  mg per tablet TAKE 1.5 TABLETS BY MOUTH 4 (FOUR) TIMES DAILY. 270 tablet 7    famotidine (PEPCID) 20 MG tablet TAKE 1 TABLET BY MOUTH EVERY DAY FOR STOMACH 30 tablet 11    furosemide (LASIX) 20 MG tablet Take 1 tablet (20 mg total) by mouth daily as needed (edema). 30 tablet 11    levothyroxine (SYNTHROID) 50 MCG tablet TAKE 1 TABLET (50 MCG TOTAL) BY MOUTH ONCE DAILY. 30 tablet 11    traMADol (ULTRAM) 50 mg tablet Take 50 mg by mouth every 6 (six) hours as needed for Pain.      trazodone (DESYREL) 100 MG tablet TAKE 2 TABLETS BY MOUTH EVERY EVENING. 60 tablet 10    venlafaxine (EFFEXOR-XR) 150 MG Cp24 TAKE ONE CAPSULE BY MOUTH EVERY DAY 90 capsule 3    venlafaxine (EFFEXOR-XR) 75 MG 24 hr capsule Take 1 capsule (75 mg total) by mouth once daily. (Patient taking differently: Take 75 mg by mouth every evening. ) 30 capsule 11    [DISCONTINUED] hydrochlorothiazide (MICROZIDE) 12.5 mg capsule Take 1 capsule (12.5 mg total) by mouth once daily. 30 capsule 11     No current facility-administered medications on file prior to visit.        Vitals:    12/28/17 1210   BP: (!) 144/82   Pulse: 76       Physical Exam:    Physical Exam   Constitutional: She is oriented to person, place, and time. She appears well-developed and well-nourished.   HENT:   Head: Normocephalic and atraumatic.   Mouth/Throat: Oropharynx is clear and moist.   Eyes: EOM are normal. Pupils are equal, round, and reactive to light.   Neck: Normal range of motion.   Cardiovascular: Normal rate, regular  rhythm and normal heart sounds.    Pulmonary/Chest: Effort normal and breath sounds normal.   Musculoskeletal:        Right shoulder: She exhibits normal range of motion, no tenderness and no swelling.        Left shoulder: She exhibits normal range of motion, no tenderness and no swelling.        Right elbow: She exhibits normal range of motion and no swelling. No tenderness found.        Left elbow: She exhibits normal range of motion and no swelling. No tenderness found.        Right wrist: She exhibits normal range of motion, no tenderness and no swelling.        Left wrist: She exhibits normal range of motion, no tenderness and no swelling.        Left hip: She exhibits decreased range of motion, decreased strength and tenderness.        Right knee: She exhibits normal range of motion and no swelling. No tenderness found.        Left knee: She exhibits normal range of motion and no swelling. No tenderness found.        Right hand: She exhibits normal range of motion, no tenderness and no swelling.        Left hand: She exhibits normal range of motion, no tenderness and no swelling.        Right foot: There is normal range of motion, no tenderness and no swelling.        Left foot: There is normal range of motion, no tenderness and no swelling.   Patient is difficult to assess strength I actually feel that she is not weak on the left lower extremity with any greater difference from her right arm as a 4 out of 4/56 on all efforts mostly stopping because of pain  No UE weakness with 4+/5 bilaterally.     Most concerning is her reluctance in internal/external rotation hip flexion and tenderness along the greater trochanter region extending into the SI joint region.  Relative to the right hip which has relatively preserved range of motion    No synovitis on 28 joint exam.   +edema b/l LE to ankle.    Neurological: She is alert and oriented to person, place, and time.   Skin: Skin is warm and dry.   No rashes,  sclerodactyly or Raynauds   Psychiatric: She has a normal mood and affect. Her behavior is normal.             Assessment:       Encounter Diagnoses   Name Primary?    Pain of left hip joint Yes    Elevated CPK     Myalgia     PD (Parkinson's disease)     DDD (degenerative disc disease), lumbar           Plan:        Pain of left hip joint  -     Sedimentation rate, manual; Future; Expected date: 12/28/2017  -     C-reactive protein; Future; Expected date: 12/28/2017  -     CK; Future; Expected date: 12/28/2017  -     Aldolase; Future; Expected date: 12/28/2017  -     Lactate dehydrogenase; Future; Expected date: 12/28/2017  -     Protein electrophoresis, serum; Future; Expected date: 12/28/2017  -     MyoMarker Panel 3; Future; Expected date: 12/28/2017  -     X-Ray Hip 2 or 3 views Left; Future; Expected date: 12/28/2017    Elevated CPK  -     CK; Future; Expected date: 12/28/2017  -     Aldolase; Future; Expected date: 12/28/2017  -     Lactate dehydrogenase; Future; Expected date: 12/28/2017    Myalgia  -     Sedimentation rate, manual; Future; Expected date: 12/28/2017  -     C-reactive protein; Future; Expected date: 12/28/2017  -     CK; Future; Expected date: 12/28/2017  -     Aldolase; Future; Expected date: 12/28/2017  -     Lactate dehydrogenase; Future; Expected date: 12/28/2017  -     Protein electrophoresis, serum; Future; Expected date: 12/28/2017  -     MyoMarker Panel 3; Future; Expected date: 12/28/2017  -     X-Ray Hip 2 or 3 views Left; Future; Expected date: 12/28/2017    PD (Parkinson's disease)    DDD (degenerative disc disease), lumbar    Other orders  -     traMADol (ULTRAM) 50 mg tablet; Take 1 tablet (50 mg total) by mouth every 6 (six) hours as needed for Pain.  Dispense: 40 tablet; Refill: 0      Hold muslce bx for now as want to r/o pain source deriving from pelvis or hip  R/o inflammatory arthritis with Mayomarker 3.   Check CPK, she has had only one elevation which was not very  significant before we subject her to bx would like to see a trend and hip inmaging.   Refilled her tramadol for now.  ASE with Tylenol #3   Want to check Left hip xray and pelvis.       Return in about 4 weeks (around 1/25/2018).      60min consultation with greater than 50% spent in counseling, chart review and coordination of care. All questions answered.  Thank you for allowing me to participate in the care of this very pleasant patient.

## 2017-12-28 NOTE — LETTER
December 28, 2017      WU Mendoza Jr., MD  [address]  [place] [address]           [facility]  [address] [facility] [address] [place] [address]  Phone: [phone]  Fax: [phone]          Patient: Dilma Conti   MR Number: [id number]   YOB: 1950   Date of Visit: 12/28/2017       Dear Dr. WU Mendoza Jr.:    Thank you for referring Dilma Conti to me for evaluation. Attached you will find relevant portions of my assessment and plan of care.    If you have questions, please do not hesitate to call me. I look forward to following Dilma Conti along with you.    Sincerely,    Jocelyn Robertson, DO    Enclosure  CC:  No Recipients    If you would like to receive this communication electronically, please contact [email or web address] or [phone] to request more information on [email or web address] Link access.    For providers and/or their staff who would like to refer a patient to [facility], please contact us through our one-stop-shop provider referral line, [facility], at [phone].    If you feel you have received this communication in error or would no longer like to receive these types of communications, please e-mail [email or web address]

## 2018-01-25 ENCOUNTER — OFFICE VISIT (OUTPATIENT)
Dept: RHEUMATOLOGY | Facility: CLINIC | Age: 68
End: 2018-01-25
Payer: MEDICARE

## 2018-01-25 ENCOUNTER — HOSPITAL ENCOUNTER (OUTPATIENT)
Dept: RADIOLOGY | Facility: HOSPITAL | Age: 68
Discharge: HOME | End: 2018-01-25
Attending: INTERNAL MEDICINE
Payer: MEDICARE

## 2018-01-25 VITALS
HEIGHT: 62 IN | WEIGHT: 144.94 LBS | HEART RATE: 72 BPM | DIASTOLIC BLOOD PRESSURE: 80 MMHG | SYSTOLIC BLOOD PRESSURE: 150 MMHG | BODY MASS INDEX: 26.67 KG/M2

## 2018-01-25 DIAGNOSIS — M79.652 PAIN OF LEFT THIGH: ICD-10-CM

## 2018-01-25 DIAGNOSIS — M79.652 PAIN OF LEFT THIGH: Primary | ICD-10-CM

## 2018-01-25 DIAGNOSIS — M25.552 ARTHRALGIA OF LEFT HIP: ICD-10-CM

## 2018-01-25 DIAGNOSIS — R60.0 LEG EDEMA, LEFT: ICD-10-CM

## 2018-01-25 PROCEDURE — 99999 PR PBB SHADOW E&M-EST. PATIENT-LVL III: CPT | Mod: PBBFAC,,, | Performed by: INTERNAL MEDICINE

## 2018-01-25 PROCEDURE — 93926 LOWER EXTREMITY STUDY: CPT | Mod: 26,,, | Performed by: RADIOLOGY

## 2018-01-25 PROCEDURE — 99214 OFFICE O/P EST MOD 30 MIN: CPT | Mod: S$PBB,,, | Performed by: INTERNAL MEDICINE

## 2018-01-25 PROCEDURE — 93926 LOWER EXTREMITY STUDY: CPT | Mod: TC,PO

## 2018-01-25 PROCEDURE — 73552 X-RAY EXAM OF FEMUR 2/>: CPT | Mod: TC,FY,PO,LT

## 2018-01-25 PROCEDURE — 93922 UPR/L XTREMITY ART 2 LEVELS: CPT | Mod: 26,,, | Performed by: RADIOLOGY

## 2018-01-25 PROCEDURE — 93922 UPR/L XTREMITY ART 2 LEVELS: CPT | Mod: TC,PO

## 2018-01-25 PROCEDURE — 99213 OFFICE O/P EST LOW 20 MIN: CPT | Mod: PBBFAC,PO | Performed by: INTERNAL MEDICINE

## 2018-01-25 PROCEDURE — 73552 X-RAY EXAM OF FEMUR 2/>: CPT | Mod: 26,LT,, | Performed by: RADIOLOGY

## 2018-01-25 PROCEDURE — 93971 EXTREMITY STUDY: CPT | Mod: 26,,, | Performed by: RADIOLOGY

## 2018-01-25 PROCEDURE — 93971 EXTREMITY STUDY: CPT | Mod: TC,PO

## 2018-01-25 RX ORDER — DEXTROMETHORPHAN HYDROBROMIDE, GUAIFENESIN 5; 100 MG/5ML; MG/5ML
650 LIQUID ORAL EVERY 8 HOURS
COMMUNITY

## 2018-01-25 RX ORDER — MELOXICAM 7.5 MG/1
7.5 TABLET ORAL DAILY
Qty: 30 TABLET | Refills: 2 | Status: SHIPPED | OUTPATIENT
Start: 2018-01-25 | End: 2018-04-25 | Stop reason: SDUPTHER

## 2018-01-25 ASSESSMENT — ROUTINE ASSESSMENT OF PATIENT INDEX DATA (RAPID3)
PATIENT GLOBAL ASSESSMENT SCORE: 8
PSYCHOLOGICAL DISTRESS SCORE: 4.4
PAIN SCORE: 9
TOTAL RAPID3 SCORE: 7.66
MDHAQ FUNCTION SCORE: 1.8

## 2018-01-25 NOTE — PROGRESS NOTES
Subjective:          Chief Complaint: Dilma Conti is a 67 y.o. female who had concerns including Disease Management.    HPI:    Patient is a 67-year-old female with a recent elevation in her CPKs 7 prior from July 2017 was normal.  Should a negative rheumatoid factor at that time.  Aldolase on November 16 was normal but the myoglobin was slightly elevated as well.  Her sedimentation rate was elevated 1.  Has been referred by Dr. Woodard to Dr. Reyes for muscle biopsy.I rec holding this     Have an MRI of her lumbar spine showed spondylosis at the L3-4 disc space is rather stable compared to prior.  She had degenerative facet changes L3-4, L4-5 and L5-S1.  No spinal stenosis noted. She is noting pain in her left leg sore to touch. Feels weakness. Left leg swelling. She is having twitching in the left quad region. Ache in the shin as well. She notes problems with left leg for very long time at least 1 year. The swelling is improved with Lasix. She is having some pain in buttocks but cannot pinpoint any back pain. She is having some upper extremity weakness.   Trialed on gabapentin which did exacerbate hallucinations. Tylenol #3 with some hallucinations.   Tylenol arthritis has helped with her symptoms in past few weeks until last 2 days. Only used a few tramadol.   Left leg started hurting again could not get comfortable and did not sleep. Buttocks , left groin. Greater trochanter, thigh, knee, calf, all are painful.        Record review appears patient was complaining of some leg aches and pains with tenderness in her muscles since at least July 2017.  She does have a history of Parkinson's disease.   She's describing significant pain that she can even tolerate people touching her legs on this seems to been present since she was diagnosed with Parkinson's.  Her November 16, 2017 visit with Dr. Woodard she was noting the left leg was painful rating 8 out of 10 more nocturnal in the morning also on the  anterior quads some left shin on his having tingling be staying sensations in the feet to be worsening over time.      Component      Latest Ref Rng & Units 11/16/2017 7/5/2017   CPK      55 - 170 U/L 197 (H) 57   Rheumatoid Factor      0.0 - 15.0 IU/mL  <10.0   Sed Rate      0 - 29 mm/Hr 31 (H)    Aldolase      1.5 - 8.1 U/L 4.4    Myoglobin      25 - 58 ng/mL 63 (H)      REVIEW OF SYSTEMS:    Review of Systems   Constitutional: Positive for malaise/fatigue. Negative for fever and weight loss.   HENT: Negative for sore throat.    Eyes: Negative for double vision, photophobia and redness.   Respiratory: Negative for cough, shortness of breath and wheezing.    Cardiovascular: Negative for chest pain, palpitations and orthopnea.   Gastrointestinal: Negative for abdominal pain, constipation and diarrhea.   Genitourinary: Negative for dysuria, hematuria and urgency.   Musculoskeletal: Positive for joint pain and myalgias. Negative for back pain.   Skin: Negative for rash.   Neurological: Positive for tingling. Negative for dizziness, focal weakness and headaches.   Endo/Heme/Allergies: Does not bruise/bleed easily.   Psychiatric/Behavioral: Positive for memory loss. Negative for depression, hallucinations and suicidal ideas.               Objective:            Past Medical History:   Diagnosis Date    Anxiety     Back pain     Back pain     Dementia in conditions classified elsewhere without behavioral disturbance     Dementia with Lewy bodies     Depression     GERD (gastroesophageal reflux disease)     Hyperlipidemia     Lumbar herniated disc     Lumbar radiculopathy     Lumbar radiculopathy     Lumbar spondylosis     Lumbosacral disc disease     Memory loss     Memory loss     PD (Parkinson's disease) 2012    Left leg apraxia, tremor.  Levodopa-responsive     Renal insufficiency     Sciatica of left side     Tremor      Family History   Problem Relation Age of Onset    Alzheimer's disease Mother      Parkinsonism Mother     Hyperlipidemia Mother     Stroke Father     Hypertension Father      Social History   Substance Use Topics    Smoking status: Former Smoker     Packs/day: 1.00     Years: 12.00     Types: Cigarettes     Quit date: 1/1/2014    Smokeless tobacco: Never Used    Alcohol use No         Current Outpatient Prescriptions on File Prior to Visit   Medication Sig Dispense Refill    alprazolam (XANAX) 0.5 MG tablet TAKE 1 TABLET BY MOUTH 3 TIMES A DAY AS NEEDED FOR ANXIETY 90 tablet 5    carbidopa-levodopa  mg (SINEMET)  mg per tablet TAKE 1.5 TABLETS BY MOUTH 4 (FOUR) TIMES DAILY. 270 tablet 7    famotidine (PEPCID) 20 MG tablet TAKE 1 TABLET BY MOUTH EVERY DAY FOR STOMACH 30 tablet 11    furosemide (LASIX) 20 MG tablet Take 1 tablet (20 mg total) by mouth daily as needed (edema). 30 tablet 11    levothyroxine (SYNTHROID) 50 MCG tablet TAKE 1 TABLET (50 MCG TOTAL) BY MOUTH ONCE DAILY. 30 tablet 11    traMADol (ULTRAM) 50 mg tablet Take 1 tablet (50 mg total) by mouth every 6 (six) hours as needed for Pain. 40 tablet 0    trazodone (DESYREL) 100 MG tablet TAKE 2 TABLETS BY MOUTH EVERY EVENING. 60 tablet 10    venlafaxine (EFFEXOR-XR) 150 MG Cp24 TAKE ONE CAPSULE BY MOUTH EVERY DAY 90 capsule 3    venlafaxine (EFFEXOR-XR) 75 MG 24 hr capsule Take 1 capsule (75 mg total) by mouth once daily. (Patient taking differently: Take 75 mg by mouth every evening. ) 30 capsule 11    valACYclovir (VALTREX) 1000 MG tablet Take 2 tablets (2,000 mg total) by mouth 2 (two) times daily. 4 tablet 2     No current facility-administered medications on file prior to visit.        Vitals:    01/25/18 1218   BP: (!) 150/80   Pulse: 72       Physical Exam:    Physical Exam   Constitutional: She is oriented to person, place, and time. She appears well-developed and well-nourished.   HENT:   Head: Normocephalic and atraumatic.   Mouth/Throat: Oropharynx is clear and moist.   Eyes: EOM are normal.  Pupils are equal, round, and reactive to light.   Neck: Normal range of motion.   Cardiovascular: Normal rate, regular rhythm and normal heart sounds.    Pulmonary/Chest: Effort normal and breath sounds normal.   Musculoskeletal:        Right shoulder: She exhibits normal range of motion, no tenderness and no swelling.        Left shoulder: She exhibits normal range of motion, no tenderness and no swelling.        Right elbow: She exhibits normal range of motion and no swelling. No tenderness found.        Left elbow: She exhibits normal range of motion and no swelling. No tenderness found.        Right wrist: She exhibits normal range of motion, no tenderness and no swelling.        Left wrist: She exhibits normal range of motion, no tenderness and no swelling.        Left hip: She exhibits decreased range of motion, decreased strength and tenderness.        Right knee: She exhibits normal range of motion and no swelling. No tenderness found.        Left knee: She exhibits normal range of motion and no swelling. No tenderness found.        Right hand: She exhibits normal range of motion, no tenderness and no swelling.        Left hand: She exhibits normal range of motion, no tenderness and no swelling.        Right foot: There is normal range of motion, no tenderness and no swelling.        Left foot: There is normal range of motion, no tenderness and no swelling.   Patient is difficult to assess strength I actually feel that she is not weak on the left lower extremity with any greater difference from her right arm as a 4 out of 4/56 on all efforts mostly stopping because of pain  No UE weakness with 4+/5 bilaterally.     Most concerning is her reluctance in internal/external rotation hip flexion and tenderness along the greater trochanter region extending into the SI joint region.  Relative to the right hip which has relatively preserved range of motion    No synovitis on 28 joint exam.   +edema b/l LE to ankle.     Neurological: She is alert and oriented to person, place, and time.   Skin: Skin is warm and dry.   No rashes, sclerodactyly or Raynauds   Psychiatric: She has a normal mood and affect. Her behavior is normal.             Assessment:       Encounter Diagnoses   Name Primary?    Pain of left thigh Yes    Leg edema, left     Arthralgia of left hip           Plan:        Pain of left thigh    Leg edema, left    Arthralgia of left hip  -     meloxicam (MOBIC) 7.5 MG tablet; Take 1 tablet (7.5 mg total) by mouth once daily.  Dispense: 30 tablet; Refill: 2    Other orders  -     Radiology US Lower Extremity Arteries Left; Future; Expected date: 01/25/2018      Hold muslce bx for now as want to r/o pain source deriving from pelvis or hip/thigh-she is very difficult to examine as pain with on 10degrees of abduction in the left hip, only 20 degress with SLR but no LBP pain. Pain to touch at all her muscles from quad, hamstring to calves.   Significant edema left leg today despite Lasix Left>right.     tramadol for now.  Will try Meloxicam 7.5 mg.   Continue Tylenol arthritis    Want to check Left legt US venous and srterial r/o any thrombosis or occlusion.   Check femur imaging.   CPK normal at last check       Follow-up in about 6 weeks (around 3/8/2018).      30min consultation with greater than 50% spent in counseling, chart review and coordination of care. All questions answered.  Thank you for allowing me to participate in the care of this very pleasant patient.

## 2018-01-26 ENCOUNTER — TELEPHONE (OUTPATIENT)
Dept: RHEUMATOLOGY | Facility: CLINIC | Age: 68
End: 2018-01-26

## 2018-01-26 NOTE — TELEPHONE ENCOUNTER
Spoke to daughter who is a caregiver.  Gave her results of all tests yesterday being negative. Per Dr. Robertson : start the Mobic to see if helps with pain. CG

## 2018-02-01 ENCOUNTER — OFFICE VISIT (OUTPATIENT)
Dept: NEUROLOGY | Facility: CLINIC | Age: 68
End: 2018-02-01
Payer: MEDICARE

## 2018-02-01 VITALS
DIASTOLIC BLOOD PRESSURE: 77 MMHG | WEIGHT: 145 LBS | SYSTOLIC BLOOD PRESSURE: 169 MMHG | BODY MASS INDEX: 26.68 KG/M2 | HEIGHT: 62 IN | HEART RATE: 94 BPM

## 2018-02-01 DIAGNOSIS — G20.A1 DEMENTIA DUE TO PARKINSON'S DISEASE WITHOUT BEHAVIORAL DISTURBANCE: ICD-10-CM

## 2018-02-01 DIAGNOSIS — I89.0 LYMPHEDEMA: ICD-10-CM

## 2018-02-01 DIAGNOSIS — M47.816 FACET ARTHRITIS OF LUMBAR REGION: ICD-10-CM

## 2018-02-01 DIAGNOSIS — M79.605 LEG PAIN, BILATERAL: ICD-10-CM

## 2018-02-01 DIAGNOSIS — F02.80 DEMENTIA DUE TO PARKINSON'S DISEASE WITHOUT BEHAVIORAL DISTURBANCE: ICD-10-CM

## 2018-02-01 DIAGNOSIS — M79.604 LEG PAIN, BILATERAL: ICD-10-CM

## 2018-02-01 DIAGNOSIS — F33.41 RECURRENT MAJOR DEPRESSIVE DISORDER, IN PARTIAL REMISSION: ICD-10-CM

## 2018-02-01 DIAGNOSIS — R11.0 NAUSEA: ICD-10-CM

## 2018-02-01 DIAGNOSIS — G20.A1 PD (PARKINSON'S DISEASE): Primary | ICD-10-CM

## 2018-02-01 DIAGNOSIS — G90.3 NEUROLOGIC ORTHOSTATIC HYPOTENSION: ICD-10-CM

## 2018-02-01 PROCEDURE — 99214 OFFICE O/P EST MOD 30 MIN: CPT | Mod: S$PBB,,, | Performed by: PSYCHIATRY & NEUROLOGY

## 2018-02-01 PROCEDURE — 1126F AMNT PAIN NOTED NONE PRSNT: CPT | Mod: ,,, | Performed by: PSYCHIATRY & NEUROLOGY

## 2018-02-01 PROCEDURE — 1159F MED LIST DOCD IN RCRD: CPT | Mod: ,,, | Performed by: PSYCHIATRY & NEUROLOGY

## 2018-02-01 PROCEDURE — 99999 PR PBB SHADOW E&M-EST. PATIENT-LVL III: CPT | Mod: PBBFAC,,, | Performed by: PSYCHIATRY & NEUROLOGY

## 2018-02-01 PROCEDURE — 99213 OFFICE O/P EST LOW 20 MIN: CPT | Mod: PBBFAC,PN | Performed by: PSYCHIATRY & NEUROLOGY

## 2018-02-01 NOTE — ASSESSMENT & PLAN NOTE
This ache is not levodopa-responsive.  Continues, though worse in left.  No response to gabapentin.  Not a typical sciatica presentation, so I doubt that diagnosis.

## 2018-02-01 NOTE — PATIENT INSTRUCTIONS
Lymphedema  The lymphatic system is made up of lymph vessels and lymph nodes, which carry a fluid called lymph. Lymph consists of waste from the cells. This fluid drains through lymph vessels under the skin to nearby lymph nodes. Lymph nodes filter waste products from the cells and kill any bacteria present before returning the lymph fluid to your blood circulation.  When the lymph vessels are damaged, lymph fluid cannot drain from tissues. This causes the lymph fluid to back up leading to swelling. This most often affects the arms or legs. Signs of lymphedema include heaviness, stiffness, or aching in an arm or leg. The limb may swell. The skin might look red. Shoes and rings may feel tight. Ankles and wrists might become less flexible.  The most common cause of damage to the lymph system is surgery or radiation for breast or testicular cancer. Other causes include repeated skin infections (cellulitis), burns, or injury to the arms or legs. It can take many years for symptoms of lymphedema to appear. Once present, lymphedema can become a chronic condition. This means the problem can be managed but not cured.   Treatment often includes use of compression garments, massage, and special exercises. Talk to your healthcare provider about these therapies and best treatment plan for you.  Home care  You can help keep the condition from getting worse. Follow all instructions you have been given. Do your exercises and wear your compression garments as recommended. Also, care for yourself as instructed.   · Small skin injuries like a cut, burn, or insect bite are more likely to cause a skin infection. Take special care to avoid injury. If you have any signs of infection, call your healthcare provider right away.  · Take care of your skin and nails. Moisturize dry skin. Wear protective gloves when doing chores such as gardening.   · Don't wear tight clothing or jewelry on the affected arm or leg. Avoid carrying bags or  other weight on the affected arm.  · Save with an electric razor instead of a razor blade.  · If at all possible, dont have blood pressure taken, get injections, or have blood drawn in the affected arm.  · If a leg is involved, dont cross your legs when sitting. Don't go barefoot.  · Avoid hot tubs, steam rooms, and saunas.  If you are at risk for lymphedema but have not developed it, these tips can help also help prevent it. Follow your healthcare provider's instructions.  Follow-up care  Follow up with your healthcare provider, or as advised.  Lymphedema can change the appearance of your body. This can be emotionally difficult to adjust to. You may benefit from a support group where practical advice and emotional support is offered. Individual counseling is another option.  When to seek medical advice  Call your healthcare provider right away if any of these occur:  · Swelling worsens  · Rash, blistering, or other skin changes on the affected limb  · Area of skin becomes red, painful, or warm to the touch  · A wound increases in pain, becomes warm, drains pus, or radiates red streaks  · Fever of 100.4°F (38°C) or higher, or as directed by your healthcare provider  Date Last Reviewed: 10/1/2016  © 1587-9411 [employer]. [address] [place] [address], [place] [address]. All rights reserved. This information is not intended as a substitute for professional medical care. Always follow your healthcare professional's instructions.

## 2018-02-01 NOTE — ASSESSMENT & PLAN NOTE
Her leg edema does not respond to lasix- I wonder if she has a primary lymphedema.     -> Advised stocking trial.

## 2018-02-01 NOTE — ASSESSMENT & PLAN NOTE
I do not think the arthropathy is the cause of her pain (she describes an ache, not radiating and also a skin sensitivity that do not sound radicular); however, I am curious about opinion from Pain specialist.  Pain block could be potentially helpful.   -> referral to Pain

## 2018-02-01 NOTE — ASSESSMENT & PLAN NOTE
Persistent nausea, though less severe than in times past.      From my note 7/5/17:  She has been through exhaustive GI evaluations with no culprit found.  Her nausea started before levodopa, but we also tried to reduce this a couple years ago without success (gait worsened, nausea remained the same).

## 2018-02-01 NOTE — PROGRESS NOTES
"     I. Chief Complaints during this visit:  f/u Patient visit for PD  ?    History of present illness:   67 y.o. W returns in f/u for parkinson's disease.  Accompanied by DNL, Jovanna.  Main complaint is her left leg pain, which can be excrutiating enough to go to ER.  Had ariel in remote past, but says the pain is different.    PD is "ok" and nausea better.    Says her edema in legs is getting worse, though objectively, looks same as 2012 to me.    Interval history 2017:  Accompanied by two daughters-in-law, Loly and Orville.  Her main complaint is her feeling of sickness and nausea every afternoon.  Pain in legs continues to be bad.  Says her muscles are tender.  Heels pain and big toe numb.  Memory continues to be poor.  Continues to have headaches.  Getting around without a walker.  This is one symptom DNLs think is pretty good.  Fell backwards in a store.  Still has some visions ( dog) intermittently.  Also hears people talking.  Ex-  in April.  Endorses depression.    Interval history 17:  Continues to feel "sick" all the time with nausea.  Continues to have significant memory problems.    No recent falls.  Seen by Isabel diaz 2     Interval history 16:  Now on rytary which also gives her nausea.  Can walk with walker.  Only one fall since last seen.  Pain is better, though still can't stay sitting or standing long or her legs hurt her.  Memory poor.  Emotionally labile.    From my note 16:  Accompanied by Loly (DNL that helps most), son and another DNL.  All say she has worsened in mobility in past couple months.    Since stopping the requip and sinemet on Monday:  - her abdominal edema is better  - her abdominal pain is worse  - her lower extremity stiffness and pain are worse      From my note 3/31/16:  ...accompanied by ex-.  I resumed trazodone and requip soon after last visit because she seemed to worsen in gait ability.  It is unclear if this made any difference as " "she remains "tore up."  Can't walk well without help.  Can't walk backwards at all.  Continue to says that she is worse since colon surgery last fall.  Feels a "pinch" in her abdomen when breathing, but also a persistent pain in abdomen. The persistant abdominal pain is the same as last year prior to colon surgery.  Still significant nausea.  Recent u/s of pelvis and ultrasounds were negative.  Extremely short-term memory loss.  Continues to have the headaches, but only upon awakening.    From my note 2/12/16:  Feels like "something is not right."  Complains of pounding headaches for about 2-3 weeks.  Occur in afternoons every day.  Sits down or lays down.  No scotomas.  Dyskinesias have recurred as well.  Significant vivid dreaming.  Poor, broken sleep.  Also "seems like some body there" outside window.  Will suddenly find herself talking out loud.  Unable to get out of bed in the morning without help.  But then can move around.  Still has the significant depression.  Crying spells.  Short term memory loss.  Hypersensitive to touch, "bones just ache."  Also complains of swelling in her abdomen and legs.  She is concerned about this.    From my note 11/6/15:  Since last seen, she had colon surgery for tortuous colon and adhesions.  She became "mean" with oxycodone, but otherwise, hospitalization was "ok."  Since then, now having regular bowel movements.  Her main concern is her memory.  Ex- endorses.    Left leg has bolts of lightening and not moving if she tries to walk.  No effect from levodopa on this.  Saw dr. Estrella yesterday and getting MRI.  Mood fluctuates, but not as poor as was earlier this summer.  Nausea is still chronic, but appetite much better.  Stomach pain better, but not resolved (takes a tramadol when bad).  No hallucinating in a while.  Mild, intermittent dizziness.    From my note 7/10/15:  At last visit, I reduced the levodopa and added requip in hopes her stomach pain would improve.  " "Her gait has worsened, but her stomach pain and nausea have not resolved.  She is not sure if she hallucinated, or not the other day.    From my note 6/19/15:  Nausea has picked up, again to point of dehydration.  Still constipated, no BM in a week.  Swaying more, but also having more tremors, very irritable.    From my note 3/20/15:  Nausea better since starting prilosec.  Less depressed.  Improved dizzy/ presyncopal spells as well.  Unsteady on feet, must use walker.  Many close-calls.  Cannot get out of a chair, so she bought a lift-chair.  Not sleeping well at night (some nights).    From my phone message 11/11/14:  No phone number taken for  nurse, so I called patient.  No longer on requip (not sure when that was stopped, but reasonable).  Double midodrine to 5mg morning and noon (was only taking 2.5mg) for a week, then up to 10mg bid.  If not doing better with BP and symptoms within the next two weeks, I may have to lower her sinemet (and work her in for an appointment).    From my note 12/30/13:  ..she seems to have spiraled down into depression, again, per daughter.  She says she is achey "all over" and cannot get comfortable at night.  She dozes during the day, but daughter tells me outside of room that she "just won't get out of bed; as if she has given up."  She also mentions that she has epigastric pain every morning.  She has spoken to PCP who recommended GI study, but she wanted to wait longer.  She has new headaches and a "spot" in left eye.  She had migraines in remote past.      II. Review of Systems -as in HPI   III.   Past Medical History    Diagnosis  Date      Depression       Lumbar spondylosis       Parkinsonism  2012      left leg apraxia and fine tremors      Family History    Problem  Relation  Age of Onset      Alzheimer's disease  Mother       Parkinsonism  Mother       Stroke  Father       Hyperlipidemia  Mother       Hypertension  Father       History      Social History      " "Marital Status:        Spouse Name:  N/A      Number of Children:  N/A      Years of Education:  N/A      Occupational History      Retired       Used to own convenience store      Social History Main Topics      Smoking status:  Current Everyday Smoker -- 1.0 packs/day for 12 years      Types:  Cigarettes      Smokeless tobacco:  None      Alcohol Use:  No      Drug Use:  No      Sexually Active:  Yes -- Male partner(s)      Other Topics  Concern      None      Social History Narrative      None    ?      Current Neuro/Psych medication dosing and times:   Midodrine 10mg morning/ noon  Sinemet 25/100 1 qid    effexor XR 150mg qd, 75mg qhs   trazodone 200 qhs?  Tramadol for pain  zofran 4 for nausea  hctz 12.5 qd (Not sure she is taking)  ????  Prior disease-specific medications tried and effect/outcomes: mirapex cause headaches, edema and hallucinations; florinef causes nausea; stalevo caused hallucinations; amantadine caused psychosis; rivastigmine; gabapentin (hallucinations)       Review of patient's allergies indicates:   Allergen Reactions    Mirapex [pramipexole]      Edema, psychosis    Bactrim [sulfamethoxazole-trimethoprim] Nausea And Vomiting    Dilaudid [hydromorphone]     Gabapentin      Pt don't remember    Lipitor [atorvastatin]     Statins-hmg-coa reductase inhibitors      Patient feels like she is having a heart attack.    Zocor [simvastatin]     Demerol [meperidine]      Pt don't remember    Flu vaccine 2011 (36 mos+)(pf)      Actually was before 2011. She cannot recall reaction, but required hospitalization       IV.  Physical Exam (Includes Part III of Unified Parkinsons Disease Rating Scale 2008)  Patient taking PD meds? Yes.        Vitals:    02/01/18 0943   BP: (!) 169/77   Pulse: 94   Weight: 65.8 kg (145 lb)   Height: 5' 2" (1.575 m)     General appearance: Well nourished, well developed, no acute distress    Awake, alert and oriented x 3  Feet are puffy, but not " pitting  No distress (does not appear in malaise as she has for past several years)  No dyskinesias; mild bradykinesia present  Left leg apraxia, severe          V.  Summarized pertinant Laboratory/ Radiological Data: ??    1/25/18 US:  1.  Bilateral ankle-brachial indices of 1.1 with no hemodynamically significant stenosis being noted as described above.    11/29/17 MRI lumbar:  1.  Spondylosis L3-L4 disc space without significant change from the previous study.  2.  Facet arthropathy L3-L4 L4-L5 and L5-S1.  3.  No disc herniations or spinal stenosis.    From my note 11/6/15:  [place] Cognitive Assessment:   13/30    Lab Results   Component Value Date    [id number] 621 08/31/2016       [facility] Visit on 01/22/2015   Component Date Value Ref Range Status    Antigliadin Abs, IgA 01/22/2015 6  <20 Units Final    Antigliadin Ab IgG 01/22/2015 3  <20 Units Final    TTG IgA 01/22/2015 7  <20 UNITS Final    TTG IgG 01/22/2015 3  <20 UNITS Final    Arsenic 01/22/2015 Not Detected  0 - 12 ng/mL Final    Lead 01/22/2015 1  0 - 9 mcg/dL Final    Cadmium 01/22/2015 0.3  0.0 - 4.9 ng/mL Final    Mercury 01/22/2015 <1  0 - 9 ng/mL Final    Venous/Capillary 01/22/2015 BILL   Final       From my note 5/27/13:  Jc Scan + for reduced dopamine right bg    Neuropsych testing 2013:  Parkinson's Dementia, mild to moderate Depressive disorder        VI. Medical Decision Making    Problem List Items Addressed This Visit        1 - High    PD (Parkinson's disease) - Primary    Overview     Left leg apraxia, tremor.  Levodopa-responsive         Current Assessment & Plan     Currently, she is doing well in this regard.  Lower dopamine equivalents (in past) caused worsening gait and mobility.  She is ambulating without a walker, which is a great improvement from 2 years ago.    Her parkinsonism is still obviously an atypical type, but more specific diagnosis continues to be elusive.     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   -> unable to tolerate memory meds            2     Facet arthritis of lumbar region    Overview     MRI 11/17:  Facet arthropathy L3-L4 L4-L5 and L5-S1.         Current Assessment & Plan     I do not think the arthropathy is the cause of her pain (she describes an ache, not radiating and also a skin sensitivity that do not sound radicular); however, I am curious about opinion from Pain specialist.  Pain block could be potentially helpful.   -> referral to Pain         Relevant Orders    Ambulatory consult to Pain Clinic    Leg pain, bilateral    Overview     "ache" and high sensitivity to touch in all limbs, but worse in legs.           Current Assessment & Plan     This ache is not levodopa-responsive.  Continues, though worse in left.  No response to gabapentin.  Not a typical sciatica presentation, so I doubt that diagnosis.            3     Neurologic orthostatic hypotension    Current Assessment & Plan     stable         Lymphedema    Overview     Present since ~2012.  Bilateral legs, unresponsive to lasix         Current Assessment & Plan     Her leg edema does not respond to lasix- I wonder if she has a primary lymphedema.     -> Advised stocking trial.         Relevant Orders    COMPRESSION STOCKINGS       4     Recurrent major depressive disorder, in partial remission    Current Assessment & Plan     Doing pretty well, considering her past lows.            5     Nausea    Overview     Chronic, persistent, starts around 1-2pm daily.         Current Assessment & Plan     Persistent nausea, though less severe than in times past.      From my note 7/5/17:  She has been through exhaustive GI " evaluations with no culprit found.  Her nausea started before levodopa, but we also tried to reduce this a couple years ago without success (gait worsened, nausea remained the same).                    Follow-up in about 3 months (around 5/1/2018) for PD.

## 2018-02-15 ENCOUNTER — PATIENT MESSAGE (OUTPATIENT)
Dept: RHEUMATOLOGY | Facility: CLINIC | Age: 68
End: 2018-02-15

## 2018-03-07 ENCOUNTER — OFFICE VISIT (OUTPATIENT)
Dept: RHEUMATOLOGY | Facility: CLINIC | Age: 68
End: 2018-03-07
Payer: MEDICARE

## 2018-03-07 VITALS
HEART RATE: 87 BPM | HEIGHT: 62 IN | WEIGHT: 141.63 LBS | DIASTOLIC BLOOD PRESSURE: 73 MMHG | SYSTOLIC BLOOD PRESSURE: 108 MMHG | BODY MASS INDEX: 26.06 KG/M2

## 2018-03-07 DIAGNOSIS — M15.9 PRIMARY OSTEOARTHRITIS INVOLVING MULTIPLE JOINTS: Primary | ICD-10-CM

## 2018-03-07 DIAGNOSIS — M54.16 LUMBAR RADICULOPATHY: ICD-10-CM

## 2018-03-07 DIAGNOSIS — I89.0 LYMPHEDEMA: ICD-10-CM

## 2018-03-07 DIAGNOSIS — M79.10 MUSCLE ACHE: ICD-10-CM

## 2018-03-07 PROCEDURE — 99999 PR PBB SHADOW E&M-EST. PATIENT-LVL III: CPT | Mod: PBBFAC,,, | Performed by: INTERNAL MEDICINE

## 2018-03-07 PROCEDURE — 99213 OFFICE O/P EST LOW 20 MIN: CPT | Mod: PBBFAC,PO | Performed by: INTERNAL MEDICINE

## 2018-03-07 PROCEDURE — 99214 OFFICE O/P EST MOD 30 MIN: CPT | Mod: S$PBB,,, | Performed by: INTERNAL MEDICINE

## 2018-03-07 ASSESSMENT — ROUTINE ASSESSMENT OF PATIENT INDEX DATA (RAPID3)
PATIENT GLOBAL ASSESSMENT SCORE: 3
MDHAQ FUNCTION SCORE: 1.1
TOTAL RAPID3 SCORE: 4.89
PAIN SCORE: 8
PSYCHOLOGICAL DISTRESS SCORE: 4.4

## 2018-03-07 NOTE — PROGRESS NOTES
Subjective:          Chief Complaint: Dilma Conti is a 67 y.o. female who had concerns including 6 week follow up.    HPI:    Patient is a 67-year-old female with a recent elevation in her CPKs 7 prior from July 2017 was normal.  Should a negative rheumatoid factor at that time.  Aldolase on November 16 was normal but the myoglobin was slightly elevated as well.  Her sedimentation rate was elevated 1.  Has been referred by Dr. Woodard to Dr. Reyes for muscle biopsy.I rec holding this     Have an MRI of her lumbar spine showed spondylosis at the L3-4 disc space is rather stable compared to prior.  She had degenerative facet changes L3-4, L4-5 and L5-S1.  No spinal stenosis noted. She is noting pain in her left leg sore to touch. Feels weakness. Left leg swelling. She is having twitching in the left quad region. Ache in the shin as well. She notes problems with left leg for very long time at least 1 year. The swelling is improved with Lasix. She is having some pain in buttocks but cannot pinpoint any back pain. She is having some upper extremity weakness.   Trialed on gabapentin which did exacerbate hallucinations. Tylenol #3 with some hallucinations.   Tylenol arthritis has helped with her symptoms in past few weeks until last 2 days. Only used a few tramadol.   Left leg started hurting again could not get comfortable and did not sleep. Buttocks , left groin. Greater trochanter, thigh, knee, calf, all are painful.        Record review appears patient was complaining of some leg aches and pains with tenderness in her muscles since at least July 2017.  She does have a history of Parkinson's disease.   She's describing significant pain that she can even tolerate people touching her legs on this seems to been present since she was diagnosed with Parkinson's.  Her November 16, 2017 visit with Dr. Woodard she was noting the left leg was painful rating 8 out of 10 more nocturnal in the morning also on the  anterior quads some left shin on his having tingling be staying sensations in the feet to be worsening over time.      Component      Latest Ref Rng & Units 11/16/2017 7/5/2017   CPK      55 - 170 U/L 197 (H) 57   Rheumatoid Factor      0.0 - 15.0 IU/mL  <10.0   Sed Rate      0 - 29 mm/Hr 31 (H)    Aldolase      1.5 - 8.1 U/L 4.4    Myoglobin      25 - 58 ng/mL 63 (H)      REVIEW OF SYSTEMS:    Review of Systems   Constitutional: Positive for malaise/fatigue. Negative for fever and weight loss.   HENT: Negative for sore throat.    Eyes: Negative for double vision, photophobia and redness.   Respiratory: Negative for cough, shortness of breath and wheezing.    Cardiovascular: Negative for chest pain, palpitations and orthopnea.   Gastrointestinal: Negative for abdominal pain, constipation and diarrhea.   Genitourinary: Negative for dysuria, hematuria and urgency.   Musculoskeletal: Positive for joint pain and myalgias. Negative for back pain.   Skin: Negative for rash.   Neurological: Positive for tingling. Negative for dizziness, focal weakness and headaches.   Endo/Heme/Allergies: Does not bruise/bleed easily.   Psychiatric/Behavioral: Positive for memory loss. Negative for depression, hallucinations and suicidal ideas.               Objective:            Past Medical History:   Diagnosis Date    Anxiety     Back pain     Back pain     Dementia in conditions classified elsewhere without behavioral disturbance     Dementia with Lewy bodies     Depression     GERD (gastroesophageal reflux disease)     Hyperlipidemia     Lumbar herniated disc     Lumbar radiculopathy     Lumbar radiculopathy     Lumbar spondylosis     Lumbosacral disc disease     Memory loss     Memory loss     PD (Parkinson's disease) 2012    Left leg apraxia, tremor.  Levodopa-responsive     Renal insufficiency     Sciatica of left side     Tremor      Family History   Problem Relation Age of Onset    Alzheimer's disease Mother      Parkinsonism Mother     Hyperlipidemia Mother     Stroke Father     Hypertension Father      Social History   Substance Use Topics    Smoking status: Former Smoker     Packs/day: 1.00     Years: 12.00     Types: Cigarettes     Quit date: 1/1/2014    Smokeless tobacco: Never Used    Alcohol use No         Current Outpatient Prescriptions on File Prior to Visit   Medication Sig Dispense Refill    acetaminophen (TYLENOL) 650 MG TbSR Take 650 mg by mouth every 8 (eight) hours.      carbidopa-levodopa  mg (SINEMET)  mg per tablet TAKE 1.5 TABLETS BY MOUTH 4 (FOUR) TIMES DAILY. 270 tablet 7    famotidine (PEPCID) 20 MG tablet TAKE 1 TABLET BY MOUTH EVERY DAY FOR STOMACH 30 tablet 11    furosemide (LASIX) 20 MG tablet Take 1 tablet (20 mg total) by mouth daily as needed (edema). 30 tablet 11    levothyroxine (SYNTHROID) 50 MCG tablet TAKE 1 TABLET (50 MCG TOTAL) BY MOUTH ONCE DAILY. 30 tablet 11    trazodone (DESYREL) 100 MG tablet TAKE 2 TABLETS BY MOUTH EVERY EVENING. 60 tablet 10    venlafaxine (EFFEXOR-XR) 150 MG Cp24 TAKE ONE CAPSULE BY MOUTH EVERY DAY 90 capsule 3    venlafaxine (EFFEXOR-XR) 75 MG 24 hr capsule TAKE 1 CAPSULE (75 MG TOTAL) BY MOUTH ONCE DAILY. 30 capsule 7    traMADol (ULTRAM) 50 mg tablet Take 1 tablet (50 mg total) by mouth every 6 (six) hours as needed for Pain. 40 tablet 0    [DISCONTINUED] valACYclovir (VALTREX) 1000 MG tablet Take 2 tablets (2,000 mg total) by mouth 2 (two) times daily. 4 tablet 2     No current facility-administered medications on file prior to visit.        Vitals:    03/07/18 1227   BP: 108/73   Pulse: 87       Physical Exam:    Physical Exam   Constitutional: She is oriented to person, place, and time. She appears well-developed and well-nourished.   HENT:   Head: Normocephalic and atraumatic.   Mouth/Throat: Oropharynx is clear and moist.   Eyes: EOM are normal. Pupils are equal, round, and reactive to light.   Neck: Normal range of motion.    Cardiovascular: Normal rate, regular rhythm and normal heart sounds.    Pulmonary/Chest: Effort normal and breath sounds normal.   Musculoskeletal:        Right shoulder: She exhibits normal range of motion, no tenderness and no swelling.        Left shoulder: She exhibits normal range of motion, no tenderness and no swelling.        Right elbow: She exhibits normal range of motion and no swelling. No tenderness found.        Left elbow: She exhibits normal range of motion and no swelling. No tenderness found.        Right wrist: She exhibits normal range of motion, no tenderness and no swelling.        Left wrist: She exhibits normal range of motion, no tenderness and no swelling.        Left hip: She exhibits decreased range of motion, decreased strength and tenderness.        Right knee: She exhibits normal range of motion and no swelling. No tenderness found.        Left knee: She exhibits normal range of motion and no swelling. No tenderness found.        Right hand: She exhibits normal range of motion, no tenderness and no swelling.        Left hand: She exhibits normal range of motion, no tenderness and no swelling.        Right foot: There is normal range of motion, no tenderness and no swelling.        Left foot: There is normal range of motion, no tenderness and no swelling.   Patient is difficult to assess strength I actually feel that she is not weak on the left lower extremity with any greater difference from her right arm as a 4 out of 4/5 on all efforts mostly stopping because of pain  No UE weakness with 4+/5 bilaterally.     Marked improvement with tolerance for ROM in hip on left and right hip. Still with tenderness in thigh. Hamstring tightness.     No synovitis on 28 joint exam.   Neurological: She is alert and oriented to person, place, and time.   Skin: Skin is warm and dry.   No rashes, sclerodactyly or Raynauds   Psychiatric: She has a normal mood and affect. Her behavior is normal.              Assessment:       Encounter Diagnoses   Name Primary?    Primary osteoarthritis involving multiple joints Yes    Muscle ache     Lymphedema     Lumbar radiculopathy           Plan:        Primary osteoarthritis involving multiple joints  -     Ambulatory referral to Home Health    Muscle ache  -     Ambulatory referral to Home Health    Lymphedema    Lumbar radiculopathy  -     Ambulatory referral to Home Health        Edema improved.   tramadol ok PRN   Will try Meloxicam 7.5 mg daily and tylenol BID. Patient reporting pain 0/10 first time since I have met her.   I would try to have PT come to house for strengthening in Lumbar and legs.         Follow-up in about 4 months (around 7/7/2018).      30min consultation with greater than 50% spent in counseling, chart review and coordination of care. All questions answered.  Thank you for allowing me to participate in the care of this very pleasant patient.

## 2018-04-04 ENCOUNTER — TELEPHONE (OUTPATIENT)
Dept: RHEUMATOLOGY | Facility: CLINIC | Age: 68
End: 2018-04-04

## 2018-04-04 NOTE — TELEPHONE ENCOUNTER
----- Message from Petra Gardiner sent at 4/4/2018  3:56 PM CDT -----  Cassandra with [facility] saw patietn today and has feelings of depression and is requesting medications to assist contact Cassandra at [phone].    Thank you     Spoke to Cassandra and recommended she contacting PCP Misbah Mendoza concerning depression medication. CG

## 2018-04-25 DIAGNOSIS — M25.552 ARTHRALGIA OF LEFT HIP: ICD-10-CM

## 2018-04-27 RX ORDER — MELOXICAM 7.5 MG/1
7.5 TABLET ORAL DAILY
Qty: 30 TABLET | Refills: 2 | Status: SHIPPED | OUTPATIENT
Start: 2018-04-27 | End: 2018-07-19 | Stop reason: SDUPTHER

## 2018-05-10 ENCOUNTER — OFFICE VISIT (OUTPATIENT)
Dept: NEUROLOGY | Facility: CLINIC | Age: 68
End: 2018-05-10
Payer: MEDICARE

## 2018-05-10 VITALS
WEIGHT: 138.63 LBS | HEART RATE: 87 BPM | HEIGHT: 61 IN | SYSTOLIC BLOOD PRESSURE: 126 MMHG | BODY MASS INDEX: 26.17 KG/M2 | DIASTOLIC BLOOD PRESSURE: 67 MMHG

## 2018-05-10 DIAGNOSIS — F02.80 DEMENTIA DUE TO PARKINSON'S DISEASE WITHOUT BEHAVIORAL DISTURBANCE: Primary | ICD-10-CM

## 2018-05-10 DIAGNOSIS — G20.A1 PD (PARKINSON'S DISEASE): ICD-10-CM

## 2018-05-10 DIAGNOSIS — F33.0 MILD EPISODE OF RECURRENT MAJOR DEPRESSIVE DISORDER: ICD-10-CM

## 2018-05-10 DIAGNOSIS — G20.A1 DEMENTIA DUE TO PARKINSON'S DISEASE WITHOUT BEHAVIORAL DISTURBANCE: Primary | ICD-10-CM

## 2018-05-10 PROCEDURE — 99214 OFFICE O/P EST MOD 30 MIN: CPT | Mod: S$PBB,,, | Performed by: PSYCHIATRY & NEUROLOGY

## 2018-05-10 PROCEDURE — 99999 PR PBB SHADOW E&M-EST. PATIENT-LVL III: CPT | Mod: PBBFAC,,, | Performed by: PSYCHIATRY & NEUROLOGY

## 2018-05-10 PROCEDURE — 99213 OFFICE O/P EST LOW 20 MIN: CPT | Mod: PBBFAC,PN | Performed by: PSYCHIATRY & NEUROLOGY

## 2018-05-10 RX ORDER — ARIPIPRAZOLE 2 MG/1
2 TABLET ORAL NIGHTLY
Qty: 30 TABLET | Refills: 11 | Status: SHIPPED | OUTPATIENT
Start: 2018-05-10 | End: 2019-05-10

## 2018-05-10 NOTE — PROGRESS NOTES
"     I. Chief Complaints during this visit:  f/u Patient visit for PD  ?    History of present illness:   67 y.o. W returns in f/u for parkinson's disease.  Accompanied by DNL, Jovanna.  Left leg pain still intermittent, but doesn't seem to be as severe as last visit.    Talking and seeing people not really there.  This occurs daily.  None are frightening, all are people she knows.  Depression is worsening, again.    Nausea is handled, per patient.  Pedal edema is less.  No falls.  Some lightheadedness.  No presyncope.    Complains of trouble falling asleep, staying asleep.      Interval history 18:  Main complaint is her left leg pain, which can be excrutiating enough to go to ER.  Had ariel in remote past, but says the pain is different.  PD is "ok" and nausea better.  Says her edema in legs is getting worse, though objectively, looks same as 2012 to me.    Interval history 2017:  Accompanied by two daughters-in-law, Loly and Orville.  Her main complaint is her feeling of sickness and nausea every afternoon.  Pain in legs continues to be bad.  Says her muscles are tender.  Heels pain and big toe numb.  Memory continues to be poor.  Continues to have headaches.  Getting around without a walker.  This is one symptom DNLs think is pretty good.  Fell backwards in a store.  Still has some visions ( dog) intermittently.  Also hears people talking.  Ex-  in April.  Endorses depression.    Interval history 17:  Continues to feel "sick" all the time with nausea.  Continues to have significant memory problems.    No recent falls.  Seen by Isabel diaz 2     Interval history 16:  Now on rytary which also gives her nausea.  Can walk with walker.  Only one fall since last seen.  Pain is better, though still can't stay sitting or standing long or her legs hurt her.  Memory poor.  Emotionally labile.    From my note 16:  Accompanied by Loly (DNL that helps most), son and another DNL.  All say " "she has worsened in mobility in past couple months.    Since stopping the requip and sinemet on Monday:  - her abdominal edema is better  - her abdominal pain is worse  - her lower extremity stiffness and pain are worse      From my note 3/31/16:  ...accompanied by ex-.  I resumed trazodone and requip soon after last visit because she seemed to worsen in gait ability.  It is unclear if this made any difference as she remains "tore up."  Can't walk well without help.  Can't walk backwards at all.  Continue to says that she is worse since colon surgery last fall.  Feels a "pinch" in her abdomen when breathing, but also a persistent pain in abdomen. The persistant abdominal pain is the same as last year prior to colon surgery.  Still significant nausea.  Recent u/s of pelvis and ultrasounds were negative.  Extremely short-term memory loss.  Continues to have the headaches, but only upon awakening.    From my note 2/12/16:  Feels like "something is not right."  Complains of pounding headaches for about 2-3 weeks.  Occur in afternoons every day.  Sits down or lays down.  No scotomas.  Dyskinesias have recurred as well.  Significant vivid dreaming.  Poor, broken sleep.  Also "seems like some body there" outside window.  Will suddenly find herself talking out loud.  Unable to get out of bed in the morning without help.  But then can move around.  Still has the significant depression.  Crying spells.  Short term memory loss.  Hypersensitive to touch, "bones just ache."  Also complains of swelling in her abdomen and legs.  She is concerned about this.    From my note 11/6/15:  Since last seen, she had colon surgery for tortuous colon and adhesions.  She became "mean" with oxycodone, but otherwise, hospitalization was "ok."  Since then, now having regular bowel movements.  Her main concern is her memory.  Ex- endorses.    Left leg has bolts of lightening and not moving if she tries to walk.  No effect from " "levodopa on this.  Saw dr. Estrella yesterday and getting MRI.  Mood fluctuates, but not as poor as was earlier this summer.  Nausea is still chronic, but appetite much better.  Stomach pain better, but not resolved (takes a tramadol when bad).  No hallucinating in a while.  Mild, intermittent dizziness.    From my note 7/10/15:  At last visit, I reduced the levodopa and added requip in hopes her stomach pain would improve.  Her gait has worsened, but her stomach pain and nausea have not resolved.  She is not sure if she hallucinated, or not the other day.    From my note 6/19/15:  Nausea has picked up, again to point of dehydration.  Still constipated, no BM in a week.  Swaying more, but also having more tremors, very irritable.    From my note 3/20/15:  Nausea better since starting prilosec.  Less depressed.  Improved dizzy/ presyncopal spells as well.  Unsteady on feet, must use walker.  Many close-calls.  Cannot get out of a chair, so she bought a lift-chair.  Not sleeping well at night (some nights).    From my phone message 11/11/14:  No phone number taken for  nurse, so I called patient.  No longer on requip (not sure when that was stopped, but reasonable).  Double midodrine to 5mg morning and noon (was only taking 2.5mg) for a week, then up to 10mg bid.  If not doing better with BP and symptoms within the next two weeks, I may have to lower her sinemet (and work her in for an appointment).    From my note 12/30/13:  ..she seems to have spiraled down into depression, again, per daughter.  She says she is achey "all over" and cannot get comfortable at night.  She dozes during the day, but daughter tells me outside of room that she "just won't get out of bed; as if she has given up."  She also mentions that she has epigastric pain every morning.  She has spoken to PCP who recommended GI study, but she wanted to wait longer.  She has new headaches and a "spot" in left eye.  She had migraines in remote " past.      II. Review of Systems -as in HPI   III.   Past Medical History    Diagnosis  Date      Depression       Lumbar spondylosis       Parkinsonism  2012      left leg apraxia and fine tremors      Family History    Problem  Relation  Age of Onset      Alzheimer's disease  Mother       Parkinsonism  Mother       Stroke  Father       Hyperlipidemia  Mother       Hypertension  Father       History      Social History      Marital Status:        Spouse Name:  N/A      Number of Children:  N/A      Years of Education:  N/A      Occupational History      Retired       Used to own convenience store      Social History Main Topics      Smoking status:  Current Everyday Smoker -- 1.0 packs/day for 12 years      Types:  Cigarettes      Smokeless tobacco:  None      Alcohol Use:  No      Drug Use:  No      Sexually Active:  Yes -- Male partner(s)      Other Topics  Concern      None      Social History Narrative      None    ?      Current Neuro/Psych medication dosing and times:   Midodrine 10mg morning/ noon  Sinemet 25/100 1 qid    effexor XR 150mg qd; 75mg qhs  trazodone 200 qhs?  Tramadol for pain  zofran 4 for nausea  hctz 12.5 qd (Not sure she is taking)  ????  Prior disease-specific medications tried and effect/outcomes: mirapex cause headaches, edema and hallucinations; florinef causes nausea; stalevo caused hallucinations; amantadine caused psychosis; rivastigmine; gabapentin (hallucinations)       Review of patient's allergies indicates:   Allergen Reactions    Mirapex [pramipexole]      Edema, psychosis    Bactrim [sulfamethoxazole-trimethoprim] Nausea And Vomiting    Dilaudid [hydromorphone]     Gabapentin      Pt don't remember    Lipitor [atorvastatin]     Statins-hmg-coa reductase inhibitors      Patient feels like she is having a heart attack.    Zocor [simvastatin]     Demerol [meperidine]      Pt don't remember    Flu vaccine 2011 (36 mos+)(pf)      Actually was before  "2011. She cannot recall reaction, but required hospitalization       IV.  Physical Exam (Includes Part III of Unified Parkinsons Disease Rating Scale 2008)  Patient taking PD meds? Yes.        Vitals:    05/10/18 1328   BP: 126/67   BP Location: Left arm   Pulse: 87   Weight: 62.9 kg (138 lb 9.6 oz)   Height: 5' 1" (1.549 m)     General appearance: Well nourished, well developed, no acute distress    Awake, alert and oriented x 3  Feet are slightly puffy, but not pitting  No distress (does not appear in malaise as she has for past several years)  No dyskinesias; mild bradykinesia present  Bilateral leg apraxia, severe  Gait:  2: Mild: Independent walking but with substantial gait impairment.           V.  Summarized pertinant Laboratory/ Radiological Data: ??    1/25/18 US:  1.  Bilateral ankle-brachial indices of 1.1 with no hemodynamically significant stenosis being noted as described above.    11/29/17 MRI lumbar:  1.  Spondylosis L3-L4 disc space without significant change from the previous study.  2.  Facet arthropathy L3-L4 L4-L5 and L5-S1.  3.  No disc herniations or spinal stenosis.    From my note 11/6/15:  David Cognitive Assessment:   13/30    Lab Results   Component Value Date    [id number] 621 08/31/2016       [facility]   Component Date Value Ref Range Status    Antigliadin Abs, IgA 01/22/2015 6  <20 Units Final    Antigliadin Ab IgG 01/22/2015 3  <20 Units Final    TTG IgA 01/22/2015 7  <20 UNITS Final    TTG IgG 01/22/2015 3  <20 UNITS Final    Arsenic 01/22/2015 Not Detected  0 - 12 ng/mL Final    Lead 01/22/2015 1  0 - 9 mcg/dL Final    Cadmium 01/22/2015 0.3  0.0 - 4.9 ng/mL Final    Mercury 01/22/2015 <1  0 - 9 ng/mL Final    Venous/Capillary 01/22/2015 BILL   Final       From my note 5/27/13:  Jc Scan + for reduced dopamine right bg    Neuropsych testing 2013:  Parkinson's Dementia, mild to moderate Depressive disorder        VI. Medical Decision " Making    Problem List Items Addressed This Visit        1 - High    Mild episode of recurrent major depressive disorder    Current Assessment & Plan     The major issue today.  She has had difficulty with medications in past, so I'm trying abilify cautiously!         Relevant Medications    ARIPiprazole (ABILIFY) 2 MG Tab       2     PD (Parkinson's disease)    Overview     Left leg apraxia, tremor.  Levodopa-responsive         Current Assessment & Plan     Patient reports worsening in physical symptoms, but not appreciated on exam.   -> no changes, but may need to increase if she has negative effects from abilify            3     Dementia due to Parkinson's disease without behavioral disturbance - Primary    Overview     11/2015 David Cognitive Assessment:  13/30 2013 neuropsych:  Mild-moderate PD dementia         Current Assessment & Plan     Still high functioning on casual exam.  Living with family (who help with medications, meals) has improved her health considerably.                 Follow-up in about 3 months (around 8/10/2018) for PD.

## 2018-05-10 NOTE — PATIENT INSTRUCTIONS
05/10/2018    Dear Dilma Conti,    Due to overwhelming demand, my [place] clinic location books to capacity very quickly every month.  I apologize for any inconveniences or delays in care.    In order to be sure your Neurologic needs met, we have two Nurse Practitioners, Isabel Auguste and Barbara Zavala, who also see patients with Memory and Movement Disorders in [place].  And, of course, when possible, please consider booking your appointment in [place], where our specialists, including me, have more appointments available.       I would like to see you Follow-up in about 3 months (around 8/10/2018) for PD..  You will be contacted by my assistant, María, once we have a schedule.      Please CALL my office (number below) if you have NOT heard from us before June 1, 2018.      Sincerely,       Teena Helton MD  Director, Movement Disorders and DBS Program  Department of Neurology  [phone]

## 2018-05-10 NOTE — ASSESSMENT & PLAN NOTE
Still high functioning on casual exam.  Living with family (who help with medications, meals) has improved her health considerably.

## 2018-05-10 NOTE — ASSESSMENT & PLAN NOTE
The major issue today.  She has had difficulty with medications in past, so I'm trying abilify cautiously!

## 2018-05-10 NOTE — ASSESSMENT & PLAN NOTE
Patient reports worsening in physical symptoms, but not appreciated on exam.   -> no changes, but may need to increase if she has negative effects from abilify

## 2018-05-29 RX ORDER — TRAZODONE HYDROCHLORIDE 100 MG/1
TABLET ORAL
Qty: 60 TABLET | Refills: 10 | Status: SHIPPED | OUTPATIENT
Start: 2018-05-29

## 2018-06-25 ENCOUNTER — TELEPHONE (OUTPATIENT)
Dept: ADMINISTRATIVE | Facility: CLINIC | Age: 68
End: 2018-06-25

## 2018-07-02 DIAGNOSIS — F32.A DEPRESSION: ICD-10-CM

## 2018-07-02 RX ORDER — VENLAFAXINE HYDROCHLORIDE 150 MG/1
CAPSULE, EXTENDED RELEASE ORAL
Qty: 90 CAPSULE | Refills: 3 | Status: ON HOLD | OUTPATIENT
Start: 2018-07-02 | End: 2018-12-11

## 2018-07-19 DIAGNOSIS — M25.552 ARTHRALGIA OF LEFT HIP: ICD-10-CM

## 2018-07-19 DIAGNOSIS — G20.A1 PD (PARKINSON'S DISEASE): ICD-10-CM

## 2018-07-19 RX ORDER — MELOXICAM 7.5 MG/1
7.5 TABLET ORAL DAILY
Qty: 30 TABLET | Refills: 2 | Status: SHIPPED | OUTPATIENT
Start: 2018-07-19 | End: 2018-10-23 | Stop reason: SDUPTHER

## 2018-07-19 RX ORDER — CARBIDOPA AND LEVODOPA 25; 100 MG/1; MG/1
1.5 TABLET ORAL 4 TIMES DAILY
Qty: 270 TABLET | Refills: 5 | Status: ON HOLD | OUTPATIENT
Start: 2018-07-19 | End: 2018-12-17 | Stop reason: SDUPTHER

## 2018-10-23 DIAGNOSIS — M25.552 ARTHRALGIA OF LEFT HIP: ICD-10-CM

## 2018-10-23 RX ORDER — MELOXICAM 7.5 MG/1
7.5 TABLET ORAL DAILY
Qty: 30 TABLET | Refills: 2 | Status: SHIPPED | OUTPATIENT
Start: 2018-10-23 | End: 2018-11-22

## 2018-12-10 ENCOUNTER — PATIENT MESSAGE (OUTPATIENT)
Dept: NEUROLOGY | Facility: CLINIC | Age: 68
End: 2018-12-10

## 2018-12-10 PROBLEM — R79.89 ELEVATED TROPONIN: Status: ACTIVE | Noted: 2018-12-10

## 2018-12-10 PROBLEM — R29.898 WEAKNESS OF BOTH LOWER EXTREMITIES: Status: ACTIVE | Noted: 2018-12-10

## 2018-12-10 PROBLEM — M54.50 ACUTE LOW BACK PAIN: Status: ACTIVE | Noted: 2018-12-10

## 2018-12-13 PROBLEM — I10 ESSENTIAL HYPERTENSION: Status: ACTIVE | Noted: 2018-12-13

## 2018-12-18 PROBLEM — Z75.8 DISCHARGE PLANNING ISSUES: Status: ACTIVE | Noted: 2018-12-18

## 2020-05-06 ENCOUNTER — LAB VISIT (OUTPATIENT)
Dept: LAB | Facility: HOSPITAL | Age: 70
End: 2020-05-06
Payer: MEDICARE

## 2020-05-06 DIAGNOSIS — Z20.822 SUSPECTED COVID-19 VIRUS INFECTION: Primary | ICD-10-CM

## 2020-05-06 DIAGNOSIS — Z20.822 SUSPECTED COVID-19 VIRUS INFECTION: ICD-10-CM

## 2020-05-06 LAB — SARS-COV-2 RNA RESP QL NAA+PROBE: NOT DETECTED

## 2020-05-06 PROCEDURE — U0002 COVID-19 LAB TEST NON-CDC: HCPCS

## 2021-01-01 ENCOUNTER — TELEPHONE (OUTPATIENT)
Dept: NEUROLOGY | Facility: CLINIC | Age: 71
End: 2021-01-01
Payer: MEDICARE

## 2024-01-30 NOTE — PROGRESS NOTES
"     I. Chief Complaints during this visit:  f/u Patient visit for PD  ?    History of present illness:   66 y.o. W returns in f/u for parkinson's disease.  Accompanied by ex-.  Continues to feel "sick" all the time with nausea.  Continues to have significant memory problems.      No recent falls.    Seen by Isabel diaz 2       Interval history 4/9/16:  Now on rytary which also gives her nausea.  Can walk with walker.  Only one fall since last seen.  Pain is better, though still can't stay sitting or standing long or her legs hurt her.  Memory poor.  Emotionally labile.    From my note 4/7/16:  Accompanied by Loly (DNL that helps most), son and another DNL.  All say she has worsened in mobility in past couple months.    Since stopping the requip and sinemet on Monday:  - her abdominal edema is better  - her abdominal pain is worse  - her lower extremity stiffness and pain are worse      From my note 3/31/16:  ...accompanied by ex-.  I resumed trazodone and requip soon after last visit because she seemed to worsen in gait ability.  It is unclear if this made any difference as she remains "tore up."  Can't walk well without help.  Can't walk backwards at all.  Continue to says that she is worse since colon surgery last fall.  Feels a "pinch" in her abdomen when breathing, but also a persistent pain in abdomen. The persistant abdominal pain is the same as last year prior to colon surgery.  Still significant nausea.  Recent u/s of pelvis and ultrasounds were negative.  Extremely short-term memory loss.  Continues to have the headaches, but only upon awakening.    From my note 2/12/16:  Feels like "something is not right."  Complains of pounding headaches for about 2-3 weeks.  Occur in afternoons every day.  Sits down or lays down.  No scotomas.  Dyskinesias have recurred as well.  Significant vivid dreaming.  Poor, broken sleep.  Also "seems like some body there" outside window.  Will suddenly find " "herself talking out loud.  Unable to get out of bed in the morning without help.  But then can move around.  Still has the significant depression.  Crying spells.  Short term memory loss.  Hypersensitive to touch, "bones just ache."  Also complains of swelling in her abdomen and legs.  She is concerned about this.    From my note 11/6/15:  Since last seen, she had colon surgery for tortuous colon and adhesions.  She became "mean" with oxycodone, but otherwise, hospitalization was "ok."  Since then, now having regular bowel movements.  Her main concern is her memory.  Ex- endorses.    Left leg has bolts of lightening and not moving if she tries to walk.  No effect from levodopa on this.  Saw dr. Estrella yesterday and getting MRI.  Mood fluctuates, but not as poor as was earlier this summer.  Nausea is still chronic, but appetite much better.  Stomach pain better, but not resolved (takes a tramadol when bad).  No hallucinating in a while.  Mild, intermittent dizziness.    From my note 7/10/15:  At last visit, I reduced the levodopa and added requip in hopes her stomach pain would improve.  Her gait has worsened, but her stomach pain and nausea have not resolved.  She is not sure if she hallucinated, or not the other day.    From my note 6/19/15:  Nausea has picked up, again to point of dehydration.  Still constipated, no BM in a week.  Swaying more, but also having more tremors, very irritable.    From my note 3/20/15:  Nausea better since starting prilosec.  Less depressed.  Improved dizzy/ presyncopal spells as well.  Unsteady on feet, must use walker.  Many close-calls.  Cannot get out of a chair, so she bought a lift-chair.  Not sleeping well at night (some nights).    From my phone message 11/11/14:  No phone number taken for  nurse, so I called patient.  No longer on requip (not sure when that was stopped, but reasonable).  Double midodrine to 5mg morning and noon (was only taking 2.5mg) for a week, " "then up to 10mg bid.  If not doing better with BP and symptoms within the next two weeks, I may have to lower her sinemet (and work her in for an appointment).    From my note 12/30/13:  ..she seems to have spiraled down into depression, again, per daughter.  She says she is achey "all over" and cannot get comfortable at night.  She dozes during the day, but daughter tells me outside of room that she "just won't get out of bed; as if she has given up."  She also mentions that she has epigastric pain every morning.  She has spoken to PCP who recommended GI study, but she wanted to wait longer.  She has new headaches and a "spot" in left eye.  She had migraines in remote past.      II. Review of Systems -as in HPI   III.   Past Medical History    Diagnosis  Date      Depression       Lumbar spondylosis       Parkinsonism  2012      left leg apraxia and fine tremors      Family History    Problem  Relation  Age of Onset      Alzheimer's disease  Mother       Parkinsonism  Mother       Stroke  Father       Hyperlipidemia  Mother       Hypertension  Father       History      Social History      Marital Status:        Spouse Name:  N/A      Number of Children:  N/A      Years of Education:  N/A      Occupational History      Retired       Used to own convenience store      Social History Main Topics      Smoking status:  Current Everyday Smoker -- 1.0 packs/day for 12 years      Types:  Cigarettes      Smokeless tobacco:  None      Alcohol Use:  No      Drug Use:  No      Sexually Active:  Yes -- Male partner(s)      Other Topics  Concern      None      Social History Narrative      None    ?      Current Neuro/Psych medication dosing and times:   Midodrine 10mg morning/ noon  Xanax 0.5mg tid  Sinemet 25/100 1.5 qid  Mirtazapine 15mg qhs (not sure she is taking this one)    effexor XR 150mg qd,   trazodone 200 qhs?  Tramadol for pain  zofran 4 for nausea  hctz 12.5 qd (Not sure she is " "taking)  ????  Prior disease-specific medications tried and effect/outcomes: mirapex cause headaches, edema and hallucinations; florinef causes nausea; stalevo caused hallucinations; amantadine caused psychosis; rivastigmine       Review of patient's allergies indicates:   Allergen Reactions    Mirapex [pramipexole]      Edema, psychosis    Bactrim [sulfamethoxazole-trimethoprim] Nausea And Vomiting    Dilaudid [hydromorphone]     Gabapentin      Pt don't remember    Lipitor [atorvastatin]     Statins-hmg-coa reductase inhibitors      Patient feels like she is having a heart attack.    Zocor [simvastatin]     Demerol [meperidine]      Pt don't remember    Flu vaccine 2011 (36 mos+)(pf)      Actually was before 2011. She cannot recall reaction, but required hospitalization       IV.  Physical Exam (Includes Part III of Unified Parkinsons Disease Rating Scale 2008)  Patient taking PD meds? Yes.    If yes, what medication and hour/minutes since last dose: ?3 days, sinemet?    Vitals:    01/04/17 0951   BP: 104/69   BP Location: Left arm   Patient Position: Sitting   BP Method: Automatic   Pulse: 87   Weight: 63.5 kg (140 lb)   Height: 5' 1.5" (1.562 m)     General appearance: Well nourished, well developed, no acute distress    Awake, alert and oriented x 3  Mild malaise  Mild dyskinesias, trunk  Left leg apraxia, severe          V.  Summarized pertinant Laboratory/ Radiological Data: ??no new    From my note 11/6/15:  David Cognitive Assessment:   13/30    Lab Results   Component Value Date    [id number] 621 08/31/2016       [facility]   Component Date Value Ref Range Status    Antigliadin Abs, IgA 01/22/2015 6  <20 Units Final    Antigliadin Ab IgG 01/22/2015 3  <20 Units Final    TTG IgA 01/22/2015 7  <20 UNITS Final    TTG IgG 01/22/2015 3  <20 UNITS Final    Arsenic 01/22/2015 Not Detected  0 - 12 ng/mL Final    Lead 01/22/2015 1  0 - 9 mcg/dL Final    Cadmium 01/22/2015 " "0.3  0.0 - 4.9 ng/mL Final    Mercury 01/22/2015 <1  0 - 9 ng/mL Final    Venous/Capillary 01/22/2015 BILL   Final       From my note 5/27/13:  Jc Scan + for reduced dopamine right bg    Neuropsych testing 2013:  Parkinson's Dementia, mild to moderate Depressive disorder        VI. Medical Decision Making  Diagnosis: Parkinsonism ?????    ?????  Tests ordered during this visit: None?????    Assessment:    1.  Parkinsonism with severe left leg apraxia, dementia and "on" dyskinesias.  Suspecting MSA given the OH and now, my suspicion of bowel dysmotility.  2.  Nausea, persisting, query decreased motility.  Worse with PD meds, but occurs even before taking them.  3.  Headaches, persisting and "terrible," though I don't know what to offer- meds caused nausea and confusion easily in her.  4.  Depression, better than a year ago, but still moderate  5.  Dysphagia, new  6. Dementia, moderate.  Executive functioning most affected.  Needing more assistance at home.  7.   Dyskinesias, moderate  8.  Orthostatic hypotension, on midodrine, improved      Plan:  1.  GI transit study, consider GI consult (saw a physician, dr johnson, in past, outside [facility])  2.  Swallow study  3.  No changes to pd or psych meds      Return in about 4 weeks (around 2/1/2017) for PD.       Orders Placed This Encounter   Procedures    NM Gastric Emptying    Fl Modified Barium Swallow Speech    Ambulatory referral to Speech Therapy       " dehydration, such as:  Dry eyes and a dry mouth.  Passing only a little urine.  Feeling thirstier than usual.     Your pain medicine is not helping.     You are dizzy or lightheaded, or you feel like you may faint.   Watch closely for changes in your health, and be sure to contact your doctor if:    You do not get better as expected.   Current as of: March 21, 2023               Content Version: 13.9  © 2006-2023 [employer].   Care instructions adapted under license by [employer]. If you have questions about a medical condition or this instruction, always ask your healthcare professional. [employer] disclaims any warranty or liability for your use of this information.         Infection After Surgery: Care Instructions  Overview  After surgery, an infection is always possible. It doesn't mean that the surgery didn't go well.  Because an infection can be serious, your doctor has taken steps to manage it.  Your doctor checked the infection and cleaned it if necessary. Your doctor may have made an opening in the area so that the pus can drain out. You may have gauze in the cut so that the area will stay open and keep draining. You may need antibiotics.  You will need to follow up with your doctor to make sure the infection has gone away.  Follow-up care is a key part of your treatment and safety. Be sure to make and go to all appointments, and call your doctor if you are having problems. It's also a good idea to know your test results and keep a list of the medicines you take.  How can you care for yourself at home?  Make sure your surgeon knows about the infection, especially if you saw another doctor about your symptoms.  If your doctor prescribed antibiotics, take them as directed. Do not stop taking them just because you feel better. You need to take the full course of antibiotics.  Ask your doctor if you can take an over-the-counter pain medicine, such as acetaminophen (Tylenol),